# Patient Record
Sex: MALE | Race: BLACK OR AFRICAN AMERICAN
[De-identification: names, ages, dates, MRNs, and addresses within clinical notes are randomized per-mention and may not be internally consistent; named-entity substitution may affect disease eponyms.]

---

## 2017-07-08 ENCOUNTER — HOSPITAL ENCOUNTER (EMERGENCY)
Dept: HOSPITAL 62 - ER | Age: 27
Discharge: HOME | End: 2017-07-08
Payer: SELF-PAY

## 2017-07-08 VITALS — SYSTOLIC BLOOD PRESSURE: 138 MMHG | DIASTOLIC BLOOD PRESSURE: 79 MMHG

## 2017-07-08 DIAGNOSIS — F17.200: ICD-10-CM

## 2017-07-08 DIAGNOSIS — K29.00: Primary | ICD-10-CM

## 2017-07-08 DIAGNOSIS — R10.10: ICD-10-CM

## 2017-07-08 DIAGNOSIS — R11.2: ICD-10-CM

## 2017-07-08 LAB
ALBUMIN SERPL-MCNC: 4.5 G/DL (ref 3.5–5)
ALP SERPL-CCNC: 75 U/L (ref 38–126)
ALT SERPL-CCNC: 35 U/L (ref 21–72)
ANION GAP SERPL CALC-SCNC: 12 MMOL/L (ref 5–19)
AST SERPL-CCNC: 20 U/L (ref 17–59)
BASOPHILS # BLD AUTO: 0.1 10^3/UL (ref 0–0.2)
BASOPHILS NFR BLD AUTO: 1.1 % (ref 0–2)
BILIRUB DIRECT SERPL-MCNC: 0.2 MG/DL (ref 0–0.4)
BILIRUB SERPL-MCNC: 1.2 MG/DL (ref 0.2–1.3)
BUN SERPL-MCNC: 12 MG/DL (ref 7–20)
CALCIUM: 9.8 MG/DL (ref 8.4–10.2)
CHLORIDE SERPL-SCNC: 104 MMOL/L (ref 98–107)
CO2 SERPL-SCNC: 24 MMOL/L (ref 22–30)
CREAT SERPL-MCNC: 0.9 MG/DL (ref 0.52–1.25)
EOSINOPHIL # BLD AUTO: 0 10^3/UL (ref 0–0.6)
EOSINOPHIL NFR BLD AUTO: 0.9 % (ref 0–6)
ERYTHROCYTE [DISTWIDTH] IN BLOOD BY AUTOMATED COUNT: 13.4 % (ref 11.5–14)
GLUCOSE SERPL-MCNC: 107 MG/DL (ref 75–110)
HCT VFR BLD CALC: 44.2 % (ref 37.9–51)
HGB BLD-MCNC: 14.8 G/DL (ref 13.5–17)
HGB HCT DIFFERENCE: 0.2
LIPASE SERPL-CCNC: 40.9 U/L (ref 23–300)
LYMPHOCYTES # BLD AUTO: 1.7 10^3/UL (ref 0.5–4.7)
LYMPHOCYTES NFR BLD AUTO: 35 % (ref 13–45)
MCH RBC QN AUTO: 33.4 PG (ref 27–33.4)
MCHC RBC AUTO-ENTMCNC: 33.5 G/DL (ref 32–36)
MCV RBC AUTO: 100 FL (ref 80–97)
MONOCYTES # BLD AUTO: 0.6 10^3/UL (ref 0.1–1.4)
MONOCYTES NFR BLD AUTO: 12.6 % (ref 3–13)
NEUTROPHILS # BLD AUTO: 2.4 10^3/UL (ref 1.7–8.2)
NEUTS SEG NFR BLD AUTO: 50.4 % (ref 42–78)
POTASSIUM SERPL-SCNC: 3.7 MMOL/L (ref 3.6–5)
PROT SERPL-MCNC: 7.6 G/DL (ref 6.3–8.2)
RBC # BLD AUTO: 4.43 10^6/UL (ref 4.35–5.55)
SODIUM SERPL-SCNC: 139.5 MMOL/L (ref 137–145)
WBC # BLD AUTO: 4.8 10^3/UL (ref 4–10.5)

## 2017-07-08 PROCEDURE — 76705 ECHO EXAM OF ABDOMEN: CPT

## 2017-07-08 PROCEDURE — 96361 HYDRATE IV INFUSION ADD-ON: CPT

## 2017-07-08 PROCEDURE — 96374 THER/PROPH/DIAG INJ IV PUSH: CPT

## 2017-07-08 PROCEDURE — 80053 COMPREHEN METABOLIC PANEL: CPT

## 2017-07-08 PROCEDURE — 83690 ASSAY OF LIPASE: CPT

## 2017-07-08 PROCEDURE — 85025 COMPLETE CBC W/AUTO DIFF WBC: CPT

## 2017-07-08 PROCEDURE — 36415 COLL VENOUS BLD VENIPUNCTURE: CPT

## 2017-07-08 PROCEDURE — 99284 EMERGENCY DEPT VISIT MOD MDM: CPT

## 2017-07-08 NOTE — RADIOLOGY REPORT (SQ)
EXAM DESCRIPTION:  U/S ABDOMEN LIMITED W/O DOP



COMPLETED DATE/TIME:  7/8/2017 6:20 pm



REASON FOR STUDY:  upper abdominal pain



COMPARISON:  None.



TECHNIQUE:  Dynamic and static grayscale images acquired of the abdomen and recorded on PACS. Additio
nal selected color Doppler and spectral images recorded.



LIMITATIONS:  None.



FINDINGS:  PANCREAS: No masses.  Visualized pancreatic duct normal caliber.

LIVER: No masses. Echotexture normal.

LIVER VASCULATURE: Normal directional flow of the main portal vein and hepatic veins.

GALLBLADDER: No stones. Normal wall thickness. No pericholecystic fluid.

ULTRASOUND-DETECTED LACY'S SIGN: Negative.

INTRAHEPATIC DUCTS AND COMMON DUCT: CBD and intrahepatic ducts normal caliber. No filling defects.

INFERIOR VENA CAVA: Normal flow.

AORTA: No aneurysm.

RIGHT KIDNEY:  Normal size. Normal echogenicity. No solid or suspicious masses. No hydronephrosis. No
 calcifications.

PERITONEAL AND RIGHT PLEURAL SPACE: No ascites or effusions.

OTHER: No other significant findings.



IMPRESSION:  NORMAL RIGHT UPPER QUADRANT ULTRASOUND.



TECHNICAL DOCUMENTATION:  JOB ID:  [id number]

 2011 [employer]- All Rights Reserved

## 2017-07-08 NOTE — ER DOCUMENT REPORT
ED General





- General


Chief Complaint: Abdominal Pain


Stated Complaint: ABDOMINAL PAIN


Time Seen by Provider: 07/08/17 17:20


Mode of Arrival: Ambulatory


Notes: 


26-year-old male with a history of gastritis presents with 1 day of upper 

abdominal pain burning radiating to the throat "like something is trying to 

crawl up out of me."  Associated with nausea and vomiting.  Not a heavy drinker 

does not take NSAIDs.  Does not take acid suppression.  He states that he did 

take a Midol, but that did not help.  Shortness of breath or pleuritic component

, hematemesis.  No diarrhea.  No foreign travel.


TRAVEL OUTSIDE OF THE [place] IN LAST 30 DAYS: No





- Related Data


Allergies/Adverse Reactions: 


 





No Known Allergies Allergy (Verified 07/08/17 16:47)


 











Past Medical History





- General


Information source: Patient - Alcohol use.  Infrequent.





- Social History


Smoking Status: Current Every Day Smoker


Family History: None


Patient has suicidal ideation: No


Patient has homicidal ideation: No


Renal/ Medical History: Denies: Hx Peritoneal Dialysis





Review of Systems





- Review of Systems


Notes: 


REVIEW OF SYSTEMS





GEN: Denies fever, chills, weight loss


ENT: Denies sore throat, nasal discharge, ear pain


EYES: Denies blurry vision, eye pain, discharge


CV: Denies chest pain, palpitations, edema


RESP: Denies cough, shortness of breath, wheezing


GI: Abdominal pain and nausea


MSK: Denies joint pain/swelling, edema, 


SKIN: Denies rash, skin lesions


LYMPH: Denies swollen glands/lymph nodes


NEURO: Denies headache, focal weakness or numbness, dizziness


PSYCH: Denies depression, suicidal or homicidal ideation








PHYSICAL EXAMINATION





General: No acute distress, well-nourished


Head: Atraumatic, normocephalic


ENT: Mouth normal, oropharynx moist, no exudates or tonsillar enlargement


Eyes: Conjunctiva normal, pupils equal, lids normal


Neck: No JVD, supple, no guarding


CVS: Normal rate, regular rhythm, no murmurs


Resp: No resp distress, equal and normal breath sounds bilaterally


GI: Nondistended, soft, no tenderness to palpation, no rebound or guarding


Ext: No deformities, no edema, normal range of motion in upper and lower ext


Back: No CVA or midline TTP


Skin: No rash, warm


Lymphatic: No lymphadeopathy noted


Neuro: Awake, alert.  Face symmetric.  GCS 15.





Physical Exam





- Vital signs


Vitals: 


 











Temp Pulse Resp BP Pulse Ox


 


 98.3 F   76   14   146/81 H  96 


 


 07/08/17 16:47  07/08/17 16:47  07/08/17 16:47  07/08/17 16:47  07/08/17 16:47














Course





- Re-evaluation


Re-evalutation: 


07/08/17 18:39


Otherwise healthy 26-year-old male history of gastritis presents with upper 

abdominal pain signs and symptoms concerning for gastroesophageal reflux versus 

gastritis.  Low suspicion for hepatic or pancreatic disease.  Abdominal exam is 

benign.  Vital signs are normal.  He already received an ultrasound and labs 

were ordered before my evaluation.  Will rule out gallstones and treat for 

GERD.  Do not think this is any severe abdominal emergency requiring CT at this 

time given his lack of fever and lack of abdominal tenderness.





07/08/17 19:15


Improved, ultrasound and labs normal.  Discharge with proton pump inhibitor.


Insert my discharge I have discussed with the patient there likely diagnosis, 

aftercare plan, follow-up plans and my usual and customary return precautions.  

They verbalized understanding of this.





- Vital Signs


Vital signs: 


 











Temp Pulse Resp BP Pulse Ox


 


 98.3 F   76   14   146/81 H  96 


 


 07/08/17 16:47  07/08/17 16:47  07/08/17 18:40  07/08/17 16:47  07/08/17 16:47














- Laboratory


Result Diagrams: 


 07/08/17 18:40





 07/08/17 18:40


Laboratory results interpreted by me: 


 











  07/08/17





  18:40


 


MCV  100 H














- Diagnostic Test


Radiology reviewed: Image reviewed, Reports reviewed





Discharge





- Discharge


Clinical Impression: 


Gastritis


Qualifiers:


 Gastritis type: unspecified gastritis Chronicity: acute Gastritis bleeding: 

without bleeding Qualified Code(s): K29.00 - Acute gastritis without bleeding





Condition: Good


Disposition: HOME, SELF-CARE


Instructions:  Abdominal Pain ([facility])


Additional Instructions: 


Abdominal pain is likely caused by a highly acidic environment in your stomach, 

skull gastritis plus gastroesophageal reflux.  Please avoid fatty foods spicy 

food alcohol and anti-inflammatory drugs like ibuprofen Motrin naproxen and 

Aleve.  Please take medicine I am prescribing you in follow-up with your 

primary care doctor in 3 days.  Please return to the ER if you vomit blood have 

dark tarry stools or have bright red blood from her bottom.





Prescriptions: 


Pantoprazole Sodium [Protonix] 40 mg PO BID #60 tablet.

## 2017-07-08 NOTE — ER DOCUMENT REPORT
ED Medical Screen (RME)





- General


Chief Complaint: Abdominal Pain


Stated Complaint: ABDOMINAL PAIN


Time Seen by Provider: 07/08/17 17:20


Mode of Arrival: Ambulatory


Information source: Patient


Notes: 


26-year-old man who presents with nausea, vomiting and upper abdominal pain.  

Patient states that the symptoms started this morning.  He denies blood in the 

vomitus or black, tarry stools.








Past medical history: None


Allergies: None


Past surgical history: Back surgery 1 month ago


TRAVEL OUTSIDE OF THE [place] IN LAST 30 DAYS: No





- Related Data


Allergies/Adverse Reactions: 


 





No Known Allergies Allergy (Verified 07/08/17 16:47)


 











Past Medical History


Renal/ Medical History: Denies: Hx Peritoneal Dialysis





Physical Exam





- Vital signs


Vitals: 





 











Temp Pulse Resp BP Pulse Ox


 


 98.3 F   76   14   146/81 H  96 


 


 07/08/17 16:47  07/08/17 16:47  07/08/17 16:47  07/08/17 16:47  07/08/17 16:47














Course





- Vital Signs


Vital signs: 





 











Temp Pulse Resp BP Pulse Ox


 


 98.3 F   76   14   146/81 H  96 


 


 07/08/17 16:47  07/08/17 16:47  07/08/17 16:47  07/08/17 16:47  07/08/17 16:47

## 2017-10-16 ENCOUNTER — HOSPITAL ENCOUNTER (EMERGENCY)
Dept: HOSPITAL 62 - ER | Age: 27
Discharge: HOME | End: 2017-10-16
Payer: SELF-PAY

## 2017-10-16 VITALS — SYSTOLIC BLOOD PRESSURE: 139 MMHG | DIASTOLIC BLOOD PRESSURE: 79 MMHG

## 2017-10-16 DIAGNOSIS — L20.89: Primary | ICD-10-CM

## 2017-10-16 PROCEDURE — 99282 EMERGENCY DEPT VISIT SF MDM: CPT

## 2017-10-16 NOTE — ER DOCUMENT REPORT
ED Skin Rash/Insect Bite/Abscs





- General


Chief Complaint: Skin Problem


Stated Complaint: SKIN IRRITATION


Mode of Arrival: Ambulatory


Information source: Patient


TRAVEL OUTSIDE OF THE [place] IN LAST 30 DAYS: No





- HPI


Patient complains to provider of: Skin rash/lesion


Onset: Last week


Onset/Duration: Gradual


Quality of pain: Burning


Severity: Moderate


Skin Character: Rash


Quality of rash: Itchy, Burning


Identify cause: Yes


Similar symptoms previously: Yes


Notes: 





Patient arrives with complaints of rash.  Patient has a history of eczema.  He 

uses Eucerin and Aquaphor.  States that he uses and asked body wash.  States 

for the last week he has had worsening symptoms to the bilateral arms posterior 

legs and posterior neck.  He denies any fever.  He states that the rash is 

itchy.  He states that it burns when he scratches it open.  No drainage.  He 

denies any nausea, vomiting, diarrhea.  No difficulty breathing or swallowing.  

He is no history of new soaps detergents lotions or medications.  He has no 

other complaints at this time.





- Related Data


Allergies/Adverse Reactions: 


 





No Known Allergies Allergy (Verified 10/16/17 16:02)


 











Past Medical History





- Social History


Smoking Status: Unknown if Ever Smoked


Family History: None


Patient has suicidal ideation: No


Renal/ Medical History: Denies: Hx Peritoneal Dialysis





- Immunizations


Hx Diphtheria, Pertussis, Tetanus Vaccination: No





Review of Systems





- Review of Systems


-: Yes All other systems reviewed and negative





Physical Exam





- Vital signs


Vitals: 





 











Temp Pulse Resp BP Pulse Ox


 


 98.5 F   58 L  16   151/97 H  96 


 


 10/16/17 16:03  10/16/17 16:03  10/16/17 16:03  10/16/17 16:03  10/16/17 16:03














- Notes


Notes: 





GENERAL: alert, cooperative, nontoxic, no distress.


HEAD: normocephalic, atraumatic


EYES: conjunctiva pink without discharge, no external redness or swelling.


EARS: no external swelling, no external redness


NOSE: atraumatic, no external swelling


MOUTH/THROAT: mucous membranes moist and pink


NECK: soft, supple, full range of motion, no meningismus.


CHEST: no distress, lungs clear and equal throughout.  No wheezing, rales, 

rhonchi.


CARDIAC: regular rate and rhythm, no murmur, normal capillary refill, normal 

pulses.  


BACK: full range of motion, no CVA tenderness.


EXTREMITIES: full range of motion of all extremities.  No redness, no swelling.


NEURO: alert and oriented 3, no focal deficits, full range of motion of all 

extremities.


PYSCH: appropriate mood, affect.  Patient is cooperative.


SKIN: pink, warm, dry, thickened skin to the bilateral antecubital areas, 

bilateral popliteal areas, posterior neck, consistent with eczema.  No 

surrounding redness.  No drainage.  No abscess.  No petechiae or vesicles.  

Slight decreased pigmentation of these areas noted.








Course





- Re-evaluation


Re-evalutation: 





10/16/17 18:04


The patient is nontoxic appearing with stable vitals.  The patient has a 

history of eczema.  He has a rash consistent with an atopic dermatitis flare.  

I will discharge the patient home with a moderate potency steroid cream.  He 

was instructed to keep the skin moist with Eucerin and Aquaphor as he has been 

doing.  He was instructed to stop using the ask body wash and to use a 

sensitive skin soap such as Dove.  Instructed to follow-up if not better in 1 

week, sooner for increased pain, fever, redness, drainage, any further concerns.





The patient is noted to have elevated blood pressure during today's emergency 

department visit.  The patient was informed of this finding.  The patient was 

instructed that this may be related to pre-hypertension and requires further 

evaluation with a primary care provider.  The patient has no hypertensive 

symptoms at this time.





The patient's emergency department workup and current diagnosis were explained 

to the patient and or family.  Follow-up instructions were provided.  

Medications if prescribed were discussed. Instructions for when to return to 

the emergency department including specific  worrisome symptoms were discussed 

with the patient and/or family.





- Vital Signs


Vital signs: 





 











Temp Pulse Resp BP Pulse Ox


 


 98.5 F   58 L  16   151/97 H  96 


 


 10/16/17 16:03  10/16/17 16:03  10/16/17 16:03  10/16/17 16:03  10/16/17 16:03














Discharge





- Discharge


Clinical Impression: 


Atopic dermatitis


Qualifiers:


 Atopic dermatitis type: flexural Qualified Code(s): L20.89 - Other atopic 

dermatitis





Condition: Stable


Disposition: HOME, SELF-CARE


Instructions:  Atopic Dermatitis (Eczema) ([facility])


Additional Instructions: 


Take medications as prescribed.  Continue using the Aquaphor and Eucerin for 

moisture.  Stop using the Atarax body wash and use of sensitive skin soap such 

as Dove.  Follow-up if not better in 1 week, sooner for increased pain, fever, 

redness, drainage, any further concerns.





Your blood pressure was elevated during today's visit.  Have this rechecked 

with your doctor.


Prescriptions: 


Hydroxyzine HCl [Atarax 25 mg Tablet] 1 - 2 tab PO QID #25 tablet


Triamcinolone Acetonide 1 applic TP BID #90 g


Forms:  Elevated Blood Pressure


Referrals: 


[facility] [Provider Group] - Follow up as needed

## 2018-05-23 ENCOUNTER — HOSPITAL ENCOUNTER (EMERGENCY)
Dept: HOSPITAL 62 - ER | Age: 28
Discharge: HOME | End: 2018-05-23
Payer: SELF-PAY

## 2018-05-23 VITALS — SYSTOLIC BLOOD PRESSURE: 114 MMHG | DIASTOLIC BLOOD PRESSURE: 69 MMHG

## 2018-05-23 DIAGNOSIS — Z20.2: Primary | ICD-10-CM

## 2018-05-23 DIAGNOSIS — R36.9: ICD-10-CM

## 2018-05-23 DIAGNOSIS — R30.9: ICD-10-CM

## 2018-05-23 LAB
APPEARANCE UR: (no result)
APTT PPP: (no result) S
BILIRUB UR QL STRIP: NEGATIVE
CHLAM PCR: DETECTED
GLUCOSE UR STRIP-MCNC: NEGATIVE MG/DL
GON PCR: DETECTED
KETONES UR STRIP-MCNC: NEGATIVE MG/DL
NITRITE UR QL STRIP: NEGATIVE
PH UR STRIP: 6 [PH] (ref 5–9)
PROT UR STRIP-MCNC: 30 MG/DL
SP GR UR STRIP: 1.03
UROBILINOGEN UR-MCNC: 4 MG/DL (ref ?–2)

## 2018-05-23 PROCEDURE — 96372 THER/PROPH/DIAG INJ SC/IM: CPT

## 2018-05-23 PROCEDURE — 99283 EMERGENCY DEPT VISIT LOW MDM: CPT

## 2018-05-23 PROCEDURE — 87591 N.GONORRHOEAE DNA AMP PROB: CPT

## 2018-05-23 PROCEDURE — 81001 URINALYSIS AUTO W/SCOPE: CPT

## 2018-05-23 PROCEDURE — 87491 CHLMYD TRACH DNA AMP PROBE: CPT

## 2018-05-23 NOTE — ER DOCUMENT REPORT
ED General





- General


Chief Complaint: STD Exposure


Stated Complaint: STD CHECK


Time Seen by Provider: 05/23/18 13:18


Mode of Arrival: Ambulatory


Information source: Patient


TRAVEL OUTSIDE OF THE [place] IN LAST 30 DAYS: No





- HPI


Notes: 





27-year-old male presents to emergency room today for complaints of having pain 

with urination as well as penile drainage 1 day.  Reports he has recently had 

unprotected sexual intercourse with a female.  Denies any lesions on penis.  

Denies any fevers or chills.  Denies any testicular pain or rectal pain.  

Patient states he has been the same partner for a couple weeks.  Pain is here 

in 10, burning.  Worse with time, nothing makes better.  Has not tried over-the-

counter treatment for this.  Denies fevers, chills,  chest pain,palpitations,  

shortness of breath, dyspnea, nausea, vomiting, diarrhea, abdominal pain, 

hematuria,blurred vision, double vision, loss of vision, speech changes, LH, 

dizziness, syncope, headaches, wheezing, ST, URI, neck pain, weakness, bowel or 

bladder dysfunction, saddle anesthesia, numbness or tingling in bilateral upper 

or lower extremities equally, muscle paralysis, weakness in bilateral upper or 

lower extremities equally or rash. Denies IV drug use.   





- Related Data


Allergies/Adverse Reactions: 


 





No Known Allergies Allergy (Verified 05/23/18 12:10)


 











Past Medical History





- General


Information source: Patient





- Social History


Smoking Status: Unknown if Ever Smoked


Family History: None, Reviewed & Not Pertinent


Renal/ Medical History: Denies: Hx Peritoneal Dialysis





- Immunizations


Hx Diphtheria, Pertussis, Tetanus Vaccination: No





Review of Systems





- Review of Systems


Constitutional: No symptoms reported


EENT: No symptoms reported


Cardiovascular: No symptoms reported


Respiratory: No symptoms reported


Gastrointestinal: No symptoms reported


Genitourinary: No symptoms reported


Male Genitourinary: See HPI


Musculoskeletal: No symptoms reported


Skin: No symptoms reported


Hematologic/Lymphatic: No symptoms reported


Neurological/Psychological: No symptoms reported





Physical Exam





- Vital signs


Vitals: 


 











Pulse Resp BP Pulse Ox


 


 92   16   132/74 H  96 


 


 05/23/18 12:24  05/23/18 12:24  05/23/18 12:24  05/23/18 12:24














- Notes


Notes: 





PHYSICAL EXAMINATION:





GENERAL: Well-appearing, well-nourished and in no acute distress.





HEAD: Atraumatic, normocephalic.





EYES: Pupils equal round and reactive to light, extraocular movements intact, 

sclera anicteric, conjunctiva are normal.





ENT: Nares patent, oropharynx clear without exudates.  Moist mucous membranes.





NECK: Normal range of motion, supple without lymphadenopathy





LUNGS: Breath sounds clear to auscultation bilaterally and equal.  No wheezes 

rales or rhonchi.





HEART: Regular rate and rhythm without murmurs





ABDOMEN: Soft, nontender, nondistended abdomen.  No guarding, no rebound.  No 

masses appreciated.





gu: no lesion noted or drainage on inspection of penile shaft. no lesions 

noted. testicles descended. 





Musculoskeletal: Normal range of motion, no pitting or edema.  No cyanosis.





NEUROLOGICAL: Cranial nerves grossly intact.  Normal speech, normal gait.  

Normal sensory, motor exams 





PSYCH: Normal mood, normal affect.





SKIN: Warm, Dry, normal turgor, no rashes or lesions noted.





Course





- Re-evaluation


Re-evalutation: 





05/23/18 14:20


Patient is afebrile, vitals stable and in no distress.  Discussed with patient 

the importance of practicing safe sex by using barrier method such as condoms.  

Discussed we are not testing him for blood-borne pathogens such as HIV or 

hepatitis C. he will required when his primary care provider without department 

for further testing.  We are treating him for chlamydia and gonorrhea today 

with 1 g of azithromycin and 250 of Rocephin.  Discussed the patient to not 

engage in any sexual intercourse for at least 10 days and that his partner 

needs to get tested and treated she had sexual intercourse for 10 days as well.

  I have reevaluated this patient multiple times and no significant life 

threatening changes, no signs of toxicity, sepsis or peritonitis are noted. The 

patient  and I have discussed the diagnosis and risks, and we agree with 

discharging home and close follow-up. We also discussed returning to the 

Emergency Department immediately if new or worsening symptoms occur with the 

understanding that symptoms and presentations can change. At this time will 

discharge with return precautions and follow-up recommendations.  Verbal 

discharge instructions given a the bedside and opportunity for questions given. 

We have discussed the symptoms which are most concerning (e.g., saddle 

anesthesia, urinary or bowel incontinence or retention, changing or worsening 

pain) that necessitate immediate return. Medication warnings reviewed. Patient 

is in agreement with this plan and has verbalized understanding of return 

precautions and the need for primary care follow-up in the next 24-72 hours.  

Patient verbalized understanding of plan of care and agree with plan of care.

















- Vital Signs


Vital signs: 


 











Temp Pulse Resp BP Pulse Ox


 


 98.2 F   57 L  16   114/69   99 


 


 05/23/18 14:34  05/23/18 14:34  05/23/18 14:34  05/23/18 14:34  05/23/18 14:34














- Laboratory


Laboratory results interpreted by me: 


 











  05/23/18





  14:00


 


Urine Protein  30 H


 


Urine Urobilinogen  4.0 H


 


Ur Leukocyte Esterase  LARGE H














Discharge





- Discharge


Clinical Impression: 


 STD exposure, Drainage from penis, Concern about STD in male without diagnosis





Condition: Good


Disposition: HOME, SELF-CARE


Additional Instructions: 


Gonorrhea





     You have been diagnosed with gonorrhea.  In men, this germ infects the 

urethra (and sometimes the throat).  Men usually have drainage from the penis 

and pain with urination.  In women, the germ infects the vagina and fallopian 

tubes.  There may be discharge and pelvic pain. Some women have no symptoms at 

all.  The infection can do permanent damage to the tubes and ovaries.  It 

should be taken very seriously.


     Treatment is antibiotics.  It's important that you receive all recommended 

medication. Use condoms to prevent spread of the infection.


     Because this infection is spread sexually, your sexual partner must be 

checked before resuming sexual relations.  If a culture shows gonorrhea germs, 

it must be reported to the health department.


     Call the doctor or return at once if you develop increasing fever, rash, 

joint swelling, severe pelvic pain, vaginal bleeding (other than your period), 

or problems with your bladder or bowels.








Chlamydia





     You have a chlamydia infection.  Chlamydia is a germ that grows inside the 

cells of the mucous membranes.  It often infects the eyes, urethra, and 

fallopian tubes.  It can cause chronic pain and scar tissue if untreated.


     Antibiotics are used to treat chlamydia.  It's important to take all the 

medicine even if there are no symptoms. Use condoms to prevent spread of the 

infection.


     Because this infection can spread by sexual contact, it's important that 

your sexual partner be checked before resuming sexual relations.  A positive 

test for chlamydia has to be reported to the health department.


     Call the doctor or return at once if you develop increasing fever, rash, 

severe pelvic pain, vaginal bleeding (other than your period), or problems with 

your bladder or bowels.








you were treated here today for chlamydia and gonorrhea with 1 g of 

azithromycin and turned 50 mg of Rocephin IM. you need to use protection every 

time you have sex. Failure to do so can result in transmission of infections. 

You have been treated for an sexually transmitted infection (STI) today. All of 

your partners should be tested and treated as they are also likely to be 

infected.  Follow-up with your primary care provider at the health department 

for blood work to check for sexually transmitted blood-borne pathogens such as 

HIV, hepatitis C, etc.  Please return if you develop abdominal pain, fever, 

persistent vomiting, or any other symptoms that are concerning to you.





Return immediately for any new or worsening symptoms.





Follow up with primary care provider, call tomorrow to make followup 

appointment.


Referrals: 


BRINA BABCOCK MD [COMMUNITY BASED STAFF] - Follow up in 1 week


[facility] [NO LOCAL MD] - Follow up as needed

## 2019-06-14 ENCOUNTER — HOSPITAL ENCOUNTER (EMERGENCY)
Dept: HOSPITAL 62 - ER | Age: 29
LOS: 1 days | Discharge: HOME | End: 2019-06-15
Payer: SELF-PAY

## 2019-06-14 DIAGNOSIS — F12.10: ICD-10-CM

## 2019-06-14 DIAGNOSIS — F17.200: ICD-10-CM

## 2019-06-14 DIAGNOSIS — R10.13: Primary | ICD-10-CM

## 2019-06-14 DIAGNOSIS — R11.2: ICD-10-CM

## 2019-06-14 LAB
ADD MANUAL DIFF: NO
ALBUMIN SERPL-MCNC: 4.9 G/DL (ref 3.5–5)
ALP SERPL-CCNC: 78 U/L (ref 38–126)
ALT SERPL-CCNC: 30 U/L (ref 21–72)
ANION GAP SERPL CALC-SCNC: 12 MMOL/L (ref 5–19)
APPEARANCE UR: (no result)
APTT PPP: (no result) S
AST SERPL-CCNC: 33 U/L (ref 17–59)
BARBITURATES UR QL SCN: NEGATIVE
BASOPHILS # BLD AUTO: 0 10^3/UL (ref 0–0.2)
BASOPHILS NFR BLD AUTO: 0.7 % (ref 0–2)
BILIRUB DIRECT SERPL-MCNC: 0.3 MG/DL (ref 0–0.4)
BILIRUB SERPL-MCNC: 1 MG/DL (ref 0.2–1.3)
BILIRUB UR QL STRIP: (no result)
BUN SERPL-MCNC: 11 MG/DL (ref 7–20)
CALCIUM: 11.1 MG/DL (ref 8.4–10.2)
CHLORIDE SERPL-SCNC: 102 MMOL/L (ref 98–107)
CO2 SERPL-SCNC: 26 MMOL/L (ref 22–30)
EOSINOPHIL # BLD AUTO: 0.1 10^3/UL (ref 0–0.6)
EOSINOPHIL NFR BLD AUTO: 2.4 % (ref 0–6)
ERYTHROCYTE [DISTWIDTH] IN BLOOD BY AUTOMATED COUNT: 14 % (ref 11.5–14)
GLUCOSE SERPL-MCNC: 88 MG/DL (ref 75–110)
GLUCOSE UR STRIP-MCNC: NEGATIVE MG/DL
HCT VFR BLD CALC: 47.3 % (ref 37.9–51)
HGB BLD-MCNC: 16.2 G/DL (ref 13.5–17)
KETONES UR STRIP-MCNC: (no result) MG/DL
LIPASE SERPL-CCNC: 41.5 U/L (ref 23–300)
LYMPHOCYTES # BLD AUTO: 1.6 10^3/UL (ref 0.5–4.7)
LYMPHOCYTES NFR BLD AUTO: 33 % (ref 13–45)
MCH RBC QN AUTO: 34.2 PG (ref 27–33.4)
MCHC RBC AUTO-ENTMCNC: 34.3 G/DL (ref 32–36)
MCV RBC AUTO: 100 FL (ref 80–97)
METHADONE UR QL SCN: NEGATIVE
MONOCYTES # BLD AUTO: 0.4 10^3/UL (ref 0.1–1.4)
MONOCYTES NFR BLD AUTO: 8.8 % (ref 3–13)
NEUTROPHILS # BLD AUTO: 2.7 10^3/UL (ref 1.7–8.2)
NEUTS SEG NFR BLD AUTO: 55.1 % (ref 42–78)
NITRITE UR QL STRIP: NEGATIVE
PCP UR QL SCN: NEGATIVE
PH UR STRIP: 5 [PH] (ref 5–9)
PLATELET # BLD: 305 10^3/UL (ref 150–450)
POTASSIUM SERPL-SCNC: 4.4 MMOL/L (ref 3.6–5)
PROT SERPL-MCNC: 8.3 G/DL (ref 6.3–8.2)
PROT UR STRIP-MCNC: 30 MG/DL
RBC # BLD AUTO: 4.74 10^6/UL (ref 4.35–5.55)
SODIUM SERPL-SCNC: 140.4 MMOL/L (ref 137–145)
SP GR UR STRIP: 1.03
TOTAL CELLS COUNTED % (AUTO): 100 %
URINE AMPHETAMINES SCREEN: NEGATIVE
URINE BENZODIAZEPINES SCREEN: NEGATIVE
URINE COCAINE SCREEN: NEGATIVE
URINE MARIJUANA (THC) SCREEN: (no result)
UROBILINOGEN UR-MCNC: 4 MG/DL (ref ?–2)
WBC # BLD AUTO: 4.9 10^3/UL (ref 4–10.5)

## 2019-06-14 PROCEDURE — 83690 ASSAY OF LIPASE: CPT

## 2019-06-14 PROCEDURE — 99284 EMERGENCY DEPT VISIT MOD MDM: CPT

## 2019-06-14 PROCEDURE — 80307 DRUG TEST PRSMV CHEM ANLYZR: CPT

## 2019-06-14 PROCEDURE — 81001 URINALYSIS AUTO W/SCOPE: CPT

## 2019-06-14 PROCEDURE — 96361 HYDRATE IV INFUSION ADD-ON: CPT

## 2019-06-14 PROCEDURE — 96374 THER/PROPH/DIAG INJ IV PUSH: CPT

## 2019-06-14 PROCEDURE — 85025 COMPLETE CBC W/AUTO DIFF WBC: CPT

## 2019-06-14 PROCEDURE — 36415 COLL VENOUS BLD VENIPUNCTURE: CPT

## 2019-06-14 PROCEDURE — 76700 US EXAM ABDOM COMPLETE: CPT

## 2019-06-14 PROCEDURE — 80053 COMPREHEN METABOLIC PANEL: CPT

## 2019-06-14 NOTE — ER DOCUMENT REPORT
ED General





- General


Chief Complaint: Abdominal Pain


Stated Complaint: STOMACH PAINS


Time Seen by Provider: 06/14/19 19:38


Mode of Arrival: Ambulatory


Notes: 





Patient is a 28-year-old male without chronic medical problems, no prior 

abdominal surgical history, presents with complaints of diffuse abdominal pain 

with associated nausea and vomiting.  States that his symptoms started earlier 

today relatively abruptly and have been ongoing since that time.  Describes the 

pain is up to him abdomen is being an aching, throbbing, constant pain mostly 

localized to the epigastrium.  States that he has been having intractable 

vomiting in association with the pain but the vomiting did start first.  

Symptoms are regarded as severe, constant, no obvious exacerbating factor.  

States he did take a hot shower earlier and it did seem to relieve his symptoms.

 Denies ever having similar symptoms in the past.  Has not seen his primary 

physician regarding today's concerns.  Denies fever or constitutional symptoms.


TRAVEL OUTSIDE OF THE [place] IN LAST 30 DAYS: No





- Related Data


Allergies/Adverse Reactions: 


                                        





No Known Allergies Allergy (Verified 06/14/19 19:43)


   











Past Medical History





- General


Information source: Patient





- Social History


Smoking Status: Current Every Day Smoker


Frequency of alcohol use: Occasional


Drug Abuse: Marijuana


Lives with: Spouse/Significant other


Family History: Reviewed & Not Pertinent


Patient has suicidal ideation: No


Patient has homicidal ideation: No


Renal/ Medical History: Denies: Hx Peritoneal Dialysis





- Immunizations


Hx Diphtheria, Pertussis, Tetanus Vaccination: No





Review of Systems





- Review of Systems


Notes: 





Constitutional: Negative for fever.


HENT: Negative for sore throat.


Eyes: Negative for visual changes.


Cardiovascular: Negative for chest pain.


Respiratory: Negative for shortness of breath.


Gastrointestinal: Positive for abdominal pain and vomiting


Genitourinary: Negative for dysuria.


Musculoskeletal: Negative for back pain.


Skin: Negative for rash.


Neurological: Negative for headaches, weakness or numbness.





10 point ROS negative except as marked above and in HPI.





Physical Exam





- Vital signs


Vitals: 


                                        











Temp Pulse Resp BP Pulse Ox


 


 98.6 F   107 H  16   147/88 H  97 


 


 06/14/19 19:20  06/14/19 19:20  06/14/19 19:20  06/14/19 19:20  06/14/19 19:20











Interpretation: Tachycardic


Notes: 





PHYSICAL EXAMINATION:





GENERAL: In around the room, in no obvious distress





HEAD: Atraumatic, normocephalic.





EYES: Pupils equal round and reactive to light, extraocular movements intact, 

sclera anicteric, conjunctiva are normal.





ENT: nares patent, oropharynx clear without exudates.  Moderately dry mucous 

membranes.





NECK: Normal range of motion, supple without lymphadenopathy





LUNGS: Breath sounds clear to auscultation bilaterally and equal.  No wheezes 

rales or rhonchi.





HEART: Regular rate and rhythm without murmurs





ABDOMEN: Soft, nontender, normoactive bowel sounds.  No guarding, no rebound.  

No masses appreciated.





EXTREMITIES: Normal range of motion, no pitting or edema.  No cyanosis.





NEUROLOGICAL: No focal neurological deficits. Moves all extremities 

spontaneously and on command.





PSYCH: High energy, unusual activity pattern for location





SKIN: Warm, Dry, normal turgor, no rashes or lesions noted.





Course





- Re-evaluation


Re-evalutation: 





06/14/19 23:04


Patient presents jumping up and down, almost jogging in place when entering to 

the room complaining of upper abdominal pain with associated nausea and vomitin

g.  The patient is bouncing vigorously about the room like take my history 

stating that this helps his discomfort.  The patient is able to lay down for an 

abdominal exam eventually and has no focal abdominal tenderness, rebound or 

guarding.  Has had associated nausea and vomiting.  Labs and right upper 

quadrant ultrasound are unremarkable.  Clinical history not consistent with 

biliary pathology, pancreatitis, bowel obstruction or perforation.  Abdominal 

exam, vitals and history are otherwise very reassuring.  Patient's rapid 

movements about the room are not consistent with renal colic.  Patient reports 

that his symptoms are much relieved by hot showers, admits to heavy daily 

marijuana use and I question whether or not this could be a presentation of 

cannabis induced cyclical abdominal pain and cyclical vomiting.  Has been 

treated with IV haloperidol and IV fluids with improvement of symptoms.  Do not 

feel CT imaging the abdomen and pelvis is indicated at this time based on 

reassuring exam and history.  Have advised cessation of marijuana use.  At this 

time will discharge with return precautions and follow-up recommendations.  

Verbal discharge instructions given a the bedside and opportunity for questions 

given. Medication warnings reviewed. Patient is in agreement with this plan and 

has verbalized understanding of return precautions and the need for primary care

follow-up in the next 24-72 hours.





- Vital Signs


Vital signs: 


                                        











Temp Pulse Resp BP Pulse Ox


 


 98.2 F   88   18   130/78 H  98 


 


 06/15/19 00:38  06/15/19 00:38  06/15/19 00:38  06/15/19 00:38  06/15/19 00:38














- Laboratory


Result Diagrams: 


                                 06/14/19 20:40





                                 06/14/19 20:40


Laboratory results interpreted by me: 


                                        











  06/14/19 06/14/19 06/14/19





  20:40 20:40 22:13


 


MCV  100 H  


 


MCH  34.2 H  


 


Calcium   11.1 H 


 


Total Protein   8.3 H 


 


Urine Protein    30 H


 


Urine Ketones    TRACE H


 


Urine Bilirubin    SMALL H


 


Urine Urobilinogen    4.0 H


 


Ur Leukocyte Esterase    SMALL H














Discharge





- Discharge


Clinical Impression: 


 Upper abdominal pain, Marijuana use





Nausea and vomiting


Qualifiers:


 Vomiting type: unspecified Vomiting Intractability: non-intractable Qualified 

Code(s): R11.2 - Nausea with vomiting, unspecified





Condition: Good


Disposition: HOME, SELF-CARE


Additional Instructions: 


Your signs and symptoms today are most consistent with marijuana induced 

hyperemesis syndrome also known as cannabinoid hyperemesis syndrome.  This 

syndrome typically occurs in people who have smoked marijuana for many years 

without any symptoms of any kind and can start abruptly.  Your symptoms will in 

general improve if you take a hot shower but ultimately the only cure to this 

illness is to discontinue the use of marijuana.  While marijuana in of itself is

not a dangerous drug, once you have developed this syndrome you typically will 

have recurrence of your symptoms anytime you smoke.  If you do have recurrence 

of these symptoms, you may apply topical capsaicin cream to your abdomen which 

can be purchased over-the-counter.  Return to the emergency department if you 

have vomiting that you cannot stop, you pass out, or you have any other symptoms

that are worrisome to you.


Forms:  Return to Work

## 2019-06-14 NOTE — ER DOCUMENT REPORT
ED Medical Screen (RME)





- General


Chief Complaint: Abdominal Pain


Stated Complaint: STOMACH PAINS


Time Seen by Provider: 06/14/19 19:38


Mode of Arrival: Ambulatory


Information source: Patient


Notes: 





28-year-old male presented to ED for complaint of upper abdominal pain across 

left and right side.  States it started yesterday.  He states he has had nausea 

and vomiting and has vomited 5 times today.  Patient states he feels very 

dehydrated.  Patient is alert oriented respirations regular and unlabored 

speaking in full sentences walks with even steady gait.  Patient does have 

active bowel sounds and does have tenderness to the upper abdomen.











I have greeted and performed a rapid initial assessment of this patient.  A 

comprehensive ED assessment and evaluation of the patient, analysis of test 

results and completion of medical decision making process will be conducted by 

an additional ED providers.





Dictation of this chart was performed using voice recognition software; 

therefore, there may be some unintended grammatical errors.


TRAVEL OUTSIDE OF THE [place] IN LAST 30 DAYS: No





- Related Data


Allergies/Adverse Reactions: 


                                        





No Known Allergies Allergy (Verified 06/14/19 19:17)


   











Past Medical History


Renal/ Medical History: Denies: Hx Peritoneal Dialysis





- Immunizations


Hx Diphtheria, Pertussis, Tetanus Vaccination: No


History of Influenza Vaccine for 10/2017 - 3/2018 Season: No





Physical Exam





- Vital signs


Vitals: 





                                        











Temp Pulse Resp BP Pulse Ox


 


 98.6 F   107 H  16   147/88 H  97 


 


 06/14/19 19:20  06/14/19 19:20  06/14/19 19:20  06/14/19 19:20  06/14/19 19:20














Course





- Vital Signs


Vital signs: 





                                        











Temp Pulse Resp BP Pulse Ox


 


 98.6 F   107 H  16   147/88 H  97 


 


 06/14/19 19:20  06/14/19 19:20  06/14/19 19:20  06/14/19 19:20  06/14/19 19:20

## 2019-06-14 NOTE — RADIOLOGY REPORT (SQ)
EXAM DESCRIPTION: 



US ABDOMEN



COMPLETED DATE/TME:  06/14/2019 19:41



CLINICAL HISTORY: 



upper abdominal pain 



COMPARISON: 



None.

 

FINDINGS: 



Visualized portion of the pancreas and hepatic parenchyma are

within normal limits. Aorta is of normal caliber and tapering.

The right kidney measured 10.3 x 4.1 x 4.6 cm. Echotexture of the

right kidney is within normal limits. There are no gallstones,

gallbladder wall thickening or pericholecystic fluid collection.

The common bile duct measured 2.6 mm which is within normal

limits. Spleen measured 9.5 cm in length and is of homogeneous

echotexture. Left kidney measured 9.2 x 5.9 x 4.4 cm. The

echotexture of both kidneys within normal limits. There is no

hydronephrosis. There is no free fluid in the abdomen.



IMPRESSION: 



Normal exam.

## 2019-06-15 VITALS — DIASTOLIC BLOOD PRESSURE: 78 MMHG | SYSTOLIC BLOOD PRESSURE: 130 MMHG

## 2019-06-21 ENCOUNTER — HOSPITAL ENCOUNTER (EMERGENCY)
Dept: HOSPITAL 62 - ER | Age: 29
Discharge: HOME | End: 2019-06-21
Payer: SELF-PAY

## 2019-06-21 VITALS — DIASTOLIC BLOOD PRESSURE: 95 MMHG | SYSTOLIC BLOOD PRESSURE: 148 MMHG

## 2019-06-21 DIAGNOSIS — F12.10: ICD-10-CM

## 2019-06-21 DIAGNOSIS — R11.2: ICD-10-CM

## 2019-06-21 DIAGNOSIS — R10.10: ICD-10-CM

## 2019-06-21 DIAGNOSIS — K29.00: Primary | ICD-10-CM

## 2019-06-21 LAB
ADD MANUAL DIFF: NO
BASOPHILS # BLD AUTO: 0 10^3/UL (ref 0–0.2)
BASOPHILS NFR BLD AUTO: 0.2 % (ref 0–2)
EOSINOPHIL # BLD AUTO: 0 10^3/UL (ref 0–0.6)
EOSINOPHIL NFR BLD AUTO: 0 % (ref 0–6)
ERYTHROCYTE [DISTWIDTH] IN BLOOD BY AUTOMATED COUNT: 13.9 % (ref 11.5–14)
HCT VFR BLD CALC: 43.6 % (ref 37.9–51)
HGB BLD-MCNC: 14.9 G/DL (ref 13.5–17)
LYMPHOCYTES # BLD AUTO: 0.8 10^3/UL (ref 0.5–4.7)
LYMPHOCYTES NFR BLD AUTO: 7.7 % (ref 13–45)
MCH RBC QN AUTO: 34.3 PG (ref 27–33.4)
MCHC RBC AUTO-ENTMCNC: 34.3 G/DL (ref 32–36)
MCV RBC AUTO: 100 FL (ref 80–97)
MONOCYTES # BLD AUTO: 0.7 10^3/UL (ref 0.1–1.4)
MONOCYTES NFR BLD AUTO: 6.6 % (ref 3–13)
NEUTROPHILS # BLD AUTO: 8.9 10^3/UL (ref 1.7–8.2)
NEUTS SEG NFR BLD AUTO: 85.5 % (ref 42–78)
PLATELET # BLD: 301 10^3/UL (ref 150–450)
RBC # BLD AUTO: 4.35 10^6/UL (ref 4.35–5.55)
TOTAL CELLS COUNTED % (AUTO): 100 %
WBC # BLD AUTO: 10.4 10^3/UL (ref 4–10.5)

## 2019-06-21 PROCEDURE — 85025 COMPLETE CBC W/AUTO DIFF WBC: CPT

## 2019-06-21 PROCEDURE — 99284 EMERGENCY DEPT VISIT MOD MDM: CPT

## 2019-06-21 PROCEDURE — 36415 COLL VENOUS BLD VENIPUNCTURE: CPT

## 2019-06-21 NOTE — ER DOCUMENT REPORT
ED Medical Screen (RME)





- General


Chief Complaint: Abdominal Pain


Stated Complaint: ABDOMINAL PAIN


Time Seen by Provider: 06/21/19 03:56


Notes: 





Patient is a 28-year-old male without chronic medical problems, no prior 

abdominal surgical history, presents with complaints of diffuse abdominal pain 

with associated nausea and vomiting.  States that his symptoms started 2 days 

ago.  Describes the pain as being an aching, throbbing, constant pain mostly 

localized to the epigastrium.  States that he has been having intractable 

vomiting approximately 20 times in association with the pain but the vomiting 

did start first.  Patient has had diarrhea x3.  Patient presented is tachycardic

to 144 but patient has been constantly jogging around the ER, running in place 

and has not sat down while waiting.





I have greeted and performed a rapid initial assessment of this patient.  A 

comprehensive ED assessment and evaluation of the patient, analysis of test 

results and completion of medical decision making process will be conducted by 

an additional ED providers.


TRAVEL OUTSIDE OF THE [place] IN LAST 30 DAYS: No





- Related Data


Allergies/Adverse Reactions: 


                                        





No Known Allergies Allergy (Verified 06/14/19 19:43)


   











Past Medical History


Renal/ Medical History: Denies: Hx Peritoneal Dialysis





- Immunizations


Hx Diphtheria, Pertussis, Tetanus Vaccination: No


History of Influenza Vaccine for 10/2017 - 3/2018 Season: No





Physical Exam





- Vital signs


Vitals: 





                                        











Temp Pulse Resp BP Pulse Ox


 


 98.9 F   144 H  24 H  148/95 H  96 


 


 06/21/19 00:44  06/21/19 00:44  06/21/19 00:44  06/21/19 00:44  06/21/19 00:44














Course





- Vital Signs


Vital signs: 





                                        











Temp Pulse Resp BP Pulse Ox


 


 98.9 F   144 H  24 H  148/95 H  96 


 


 06/21/19 00:44  06/21/19 00:44  06/21/19 00:44  06/21/19 00:44  06/21/19 00:44














- Laboratory


Result Diagrams: 


                                 06/21/19 03:13





                                 06/21/19 03:13


Laboratory results interpreted by me: 





                                        











  06/21/19





  03:13


 


MCV  100 H


 


MCH  34.3 H


 


Seg Neutrophils %  85.5 H


 


Lymphocytes %  7.7 L


 


Absolute Neutrophils  8.9 H

## 2019-06-21 NOTE — ER DOCUMENT REPORT
ED General





- General


Chief Complaint: Abdominal Pain


Stated Complaint: ABDOMINAL PAIN


Time Seen by Provider: 06/21/19 03:56


Notes: 





Patient presents with 2 days upper abdominal pain vomiting diarrhea.  Decreased 

oral intake.  Moderate.  Minimal pain now.  I received fluids and medications at

triage.  Smokes marijuana daily.  No fevers.  No urinary symptoms.


TRAVEL OUTSIDE OF THE [place] IN LAST 30 DAYS: No





- Related Data


Allergies/Adverse Reactions: 


                                        





No Known Allergies Allergy (Verified 06/14/19 19:43)


   











Past Medical History





- Social History


Smoking Status: Never Smoker


Drug Abuse: Marijuana


Family History: Reviewed & Not Pertinent


Renal/ Medical History: Denies: Hx Peritoneal Dialysis





- Immunizations


Hx Diphtheria, Pertussis, Tetanus Vaccination: No





Review of Systems





- Review of Systems


Notes: 





REVIEW OF SYSTEMS





GEN: Denies fever, chills, weight loss


ENT: Denies sore throat, nasal discharge, ear pain


EYES: Denies blurry vision, eye pain, discharge


CV: Denies chest pain, palpitations, edema


RESP: Denies cough, shortness of breath, wheezing


GI: HPI


MSK: Denies joint pain/swelling, edema, 


SKIN: Denies rash, skin lesions


LYMPH: Denies swollen glands/lymph nodes


NEURO: Denies headache, focal weakness or numbness, dizziness


PSYCH: Denies depression, suicidal or homicidal ideation








PHYSICAL EXAMINATION





General: No acute distress, well-nourished


Head: Atraumatic, normocephalic


ENT: Mouth normal, oropharynx moist, no exudates or tonsillar enlargement


Eyes: Conjunctiva normal, pupils equal, lids normal


Neck: No JVD, supple, no guarding


CVS: Normal rate, regular rhythm, no murmurs


Resp: No resp distress, equal and normal breath sounds bilaterally


GI: Nondistended, soft, no tenderness to palpation, no rebound or guarding


Ext: No deformities, no edema, normal range of motion in upper and lower ext


Back: No CVA or midline TTP


Skin: No rash, warm


Lymphatic: No lymphadeopathy noted


Neuro: Awake, alert.  Face symmetric.  GCS 15.





Physical Exam





- Vital signs


Vitals: 


                                        











Temp Pulse Resp BP Pulse Ox


 


 98.9 F   144 H  24 H  148/95 H  96 


 


 06/21/19 00:44  06/21/19 00:44  06/21/19 00:44  06/21/19 00:44  06/21/19 00:44














Course





- Re-evaluation


Re-evalutation: 





06/21/19 13:21


Recurrent upper abdominal pain vomiting in setting of a marijuana use likely 

gastritis or cannabinoid hyperemesis.  Labs normal no tenderness.  Symptoms 

resolved after medications and fluids.  Repeat exam nontender.  Tolerated p.o.  

No indication for imaging.  Discharged in stable condition.  Recommended 

marijuana cessation.  I have discussed with the patient there likely diagnosis, 

aftercare plan, follow-up plans and my usual and customary return precautions.  

They verbalized understanding of this.





- Vital Signs


Vital signs: 


                                        











Temp Pulse Resp BP Pulse Ox


 


 98.9 F   144 H  24 H  148/95 H  96 


 


 06/21/19 00:44  06/21/19 00:44  06/21/19 00:44  06/21/19 00:44  06/21/19 00:44














- Laboratory


Result Diagrams: 


                                 06/21/19 03:13





                                 06/21/19 03:13


Laboratory results interpreted by me: 


                                        











  06/21/19





  03:13


 


MCV  100 H


 


MCH  34.3 H


 


Seg Neutrophils %  85.5 H


 


Lymphocytes %  7.7 L


 


Absolute Neutrophils  8.9 H














Discharge





- Discharge


Clinical Impression: 


Gastritis


Qualifiers:


 Gastritis type: unspecified gastritis Chronicity: acute Gastritis bleeding: 

without bleeding Qualified Code(s): K29.00 - Acute gastritis without bleeding





Condition: Good


Disposition: HOME, SELF-CARE


Instructions:  Abdominal Pain ([facility]), Gastritis ([facility])


Prescriptions: 


RX: Lansoprazole [Prevacid 24Hr] 15 mg PO DAILY 30 Days  capsule.


Ondansetron [Zofran Odt 4 mg Tablet] 1 - 2 tab PO Q4H PRN #15 tab.rapdis


 PRN Reason: For Nausea/Vomiting


Forms:  Return to Work

## 2019-06-22 ENCOUNTER — HOSPITAL ENCOUNTER (INPATIENT)
Dept: HOSPITAL 62 - ER | Age: 29
LOS: 3 days | Discharge: HOME | DRG: 683 | End: 2019-06-25
Attending: INTERNAL MEDICINE | Admitting: INTERNAL MEDICINE
Payer: SELF-PAY

## 2019-06-22 DIAGNOSIS — N17.9: Primary | ICD-10-CM

## 2019-06-22 DIAGNOSIS — K29.60: ICD-10-CM

## 2019-06-22 DIAGNOSIS — F17.210: ICD-10-CM

## 2019-06-22 DIAGNOSIS — M62.82: ICD-10-CM

## 2019-06-22 DIAGNOSIS — F12.90: ICD-10-CM

## 2019-06-22 LAB
ADD MANUAL DIFF: NO
ALBUMIN SERPL-MCNC: 5.1 G/DL (ref 3.5–5)
ALP SERPL-CCNC: 66 U/L (ref 38–126)
ALT SERPL-CCNC: 97 U/L (ref 21–72)
ANION GAP SERPL CALC-SCNC: 13 MMOL/L (ref 5–19)
APPEARANCE UR: (no result)
APTT PPP: YELLOW S
AST SERPL-CCNC: 366 U/L (ref 17–59)
BARBITURATES UR QL SCN: NEGATIVE
BASOPHILS # BLD AUTO: 0 10^3/UL (ref 0–0.2)
BASOPHILS NFR BLD AUTO: 0.6 % (ref 0–2)
BILIRUB DIRECT SERPL-MCNC: 0.3 MG/DL (ref 0–0.4)
BILIRUB SERPL-MCNC: 1.3 MG/DL (ref 0.2–1.3)
BILIRUB UR QL STRIP: NEGATIVE
BUN SERPL-MCNC: 21 MG/DL (ref 7–20)
CALCIUM: 10.5 MG/DL (ref 8.4–10.2)
CHLORIDE SERPL-SCNC: 98 MMOL/L (ref 98–107)
CK MB SERPL-MCNC: 28 NG/ML (ref ?–4.55)
CO2 SERPL-SCNC: 26 MMOL/L (ref 22–30)
EOSINOPHIL # BLD AUTO: 0 10^3/UL (ref 0–0.6)
EOSINOPHIL NFR BLD AUTO: 0 % (ref 0–6)
ERYTHROCYTE [DISTWIDTH] IN BLOOD BY AUTOMATED COUNT: 13.5 % (ref 11.5–14)
GLUCOSE SERPL-MCNC: 104 MG/DL (ref 75–110)
GLUCOSE UR STRIP-MCNC: NEGATIVE MG/DL
HCT VFR BLD CALC: 45 % (ref 37.9–51)
HGB BLD-MCNC: 15.6 G/DL (ref 13.5–17)
KETONES UR STRIP-MCNC: (no result) MG/DL
LIPASE SERPL-CCNC: 58.8 U/L (ref 23–300)
LYMPHOCYTES # BLD AUTO: 1.4 10^3/UL (ref 0.5–4.7)
LYMPHOCYTES NFR BLD AUTO: 18.5 % (ref 13–45)
MCH RBC QN AUTO: 34.2 PG (ref 27–33.4)
MCHC RBC AUTO-ENTMCNC: 34.7 G/DL (ref 32–36)
MCV RBC AUTO: 98 FL (ref 80–97)
METHADONE UR QL SCN: NEGATIVE
MONOCYTES # BLD AUTO: 0.9 10^3/UL (ref 0.1–1.4)
MONOCYTES NFR BLD AUTO: 11.3 % (ref 3–13)
NEUTROPHILS # BLD AUTO: 5.3 10^3/UL (ref 1.7–8.2)
NEUTS SEG NFR BLD AUTO: 69.6 % (ref 42–78)
NITRITE UR QL STRIP: NEGATIVE
PCP UR QL SCN: NEGATIVE
PH UR STRIP: 6 [PH] (ref 5–9)
PLATELET # BLD: 239 10^3/UL (ref 150–450)
POTASSIUM SERPL-SCNC: 4.3 MMOL/L (ref 3.6–5)
PROT SERPL-MCNC: 8.2 G/DL (ref 6.3–8.2)
PROT UR STRIP-MCNC: 30 MG/DL
RBC # BLD AUTO: 4.57 10^6/UL (ref 4.35–5.55)
SODIUM SERPL-SCNC: 137.4 MMOL/L (ref 137–145)
SP GR UR STRIP: 1.02
TOTAL CELLS COUNTED % (AUTO): 100 %
TROPONIN I SERPL-MCNC: < 0.012 NG/ML
URINE AMPHETAMINES SCREEN: NEGATIVE
URINE BENZODIAZEPINES SCREEN: NEGATIVE
URINE COCAINE SCREEN: NEGATIVE
URINE MARIJUANA (THC) SCREEN: (no result)
UROBILINOGEN UR-MCNC: NEGATIVE MG/DL (ref ?–2)
WBC # BLD AUTO: 7.6 10^3/UL (ref 4–10.5)

## 2019-06-22 PROCEDURE — 85025 COMPLETE CBC W/AUTO DIFF WBC: CPT

## 2019-06-22 PROCEDURE — 81001 URINALYSIS AUTO W/SCOPE: CPT

## 2019-06-22 PROCEDURE — 99285 EMERGENCY DEPT VISIT HI MDM: CPT

## 2019-06-22 PROCEDURE — 82550 ASSAY OF CK (CPK): CPT

## 2019-06-22 PROCEDURE — 80053 COMPREHEN METABOLIC PANEL: CPT

## 2019-06-22 PROCEDURE — S0164 INJECTION PANTROPRAZOLE: HCPCS

## 2019-06-22 PROCEDURE — 80048 BASIC METABOLIC PNL TOTAL CA: CPT

## 2019-06-22 PROCEDURE — 93976 VASCULAR STUDY: CPT

## 2019-06-22 PROCEDURE — 84484 ASSAY OF TROPONIN QUANT: CPT

## 2019-06-22 PROCEDURE — 85027 COMPLETE CBC AUTOMATED: CPT

## 2019-06-22 PROCEDURE — S0183 PROCHLORPERAZINE 5 MG: HCPCS

## 2019-06-22 PROCEDURE — 83690 ASSAY OF LIPASE: CPT

## 2019-06-22 PROCEDURE — 96372 THER/PROPH/DIAG INJ SC/IM: CPT

## 2019-06-22 PROCEDURE — 36415 COLL VENOUS BLD VENIPUNCTURE: CPT

## 2019-06-22 PROCEDURE — 82553 CREATINE MB FRACTION: CPT

## 2019-06-22 PROCEDURE — 80307 DRUG TEST PRSMV CHEM ANLYZR: CPT

## 2019-06-22 PROCEDURE — 76705 ECHO EXAM OF ABDOMEN: CPT

## 2019-06-22 RX ADMIN — TRAMADOL HYDROCHLORIDE PRN MG: 50 TABLET, FILM COATED ORAL at 21:36

## 2019-06-22 RX ADMIN — HEPARIN SODIUM SCH: 5000 INJECTION, SOLUTION INTRAVENOUS; SUBCUTANEOUS at 21:37

## 2019-06-22 RX ADMIN — SODIUM CHLORIDE PRN MLS/HR: 9 INJECTION, SOLUTION INTRAVENOUS at 09:16

## 2019-06-22 RX ADMIN — HALOPERIDOL LACTATE PRN MG: 5 INJECTION, SOLUTION INTRAMUSCULAR at 14:52

## 2019-06-22 RX ADMIN — Medication SCH: at 21:37

## 2019-06-22 RX ADMIN — SODIUM CHLORIDE PRN MLS/HR: 9 INJECTION, SOLUTION INTRAVENOUS at 17:38

## 2019-06-22 RX ADMIN — SUCRALFATE SCH GM: 1 TABLET ORAL at 21:36

## 2019-06-22 RX ADMIN — HEPARIN SODIUM SCH: 5000 INJECTION, SOLUTION INTRAVENOUS; SUBCUTANEOUS at 14:52

## 2019-06-22 RX ADMIN — DOCUSATE SODIUM SCH MG: 100 CAPSULE, LIQUID FILLED ORAL at 09:16

## 2019-06-22 RX ADMIN — Medication SCH: at 14:52

## 2019-06-22 NOTE — ER DOCUMENT REPORT
Doctor's Note


Notes: 





06/22/19 05:05


Performed a quick triage evaluation the patient.  Patient was seen in triage by 

nurse practitioner.  Patient is come back to the room.  I did evaluate the 

patient and complains of some upper abdominal pain.  He did improve some of the 

GI cocktail.  He was actually seen here approximately 10 hours ago with the same

complaints.  Labs at that time were non-concerning however he was tachycardic 

and returns tachycardic.  On exam his pain in the right upper quadrant he says 

this is where it hurts whenever he eats.  He has not had an ultrasound of his 

gallbladder or any work-up of the gallbladder thus far.  I did do a stool 

guaiac.  On rectal exam stool was brown.  Guaiac testing is negative.  I have 

ordered CBC, CMP, lipase, and ultrasound of his gallbladder.  Patient is 

agreeable to plan.





Dictation of this chart was performed using voice recognition software; 

therefore, there may be some unintended grammatical errors.


06/22/19 05:06

## 2019-06-22 NOTE — PDOC H&P
History of Present Illness


Admission Date/PCP: 


  06/22/19 07:59





  





Patient complains of: epigastric pain


History of Present Illness: 


ARJUN DOMINGUEZ is a 28 year old male with a past medical history significant 

for marijuana use, tobacco dependence, and multiple recent ER visits for right 

upper quadrant and epigastric abdominal discomfort diagnosed with cannabis hy

peremesis syndrome.


He presented to the emergency department again today with complaint of upper abd

ominal discomfort and nausea unrelieved by prescribed Prevacid and Zofran.  He 

reports the only thing that relieves his pain is to jog in place.  He was noted 

to be jogging during the ED triage assessment.


Evaluation by the emergency department providers have revealed tachycardia (HR 

126), hypertension (154/99), normal CBC, chemistry revealing acute kidney injury

(creatinine 1.62, BUN 21), and rhabdomyolysis (CK of 11,878).  Has a elevated 

CK-MB at 28 but a normal troponin.  Urinalysis is positive for protein and 

ketones.  UDS positive for opiates (morphine provided by the ED provider) and 

marijuana.


He is referred to the hospitalist service for admission and management of the 

above-stated complaints.





Past Medical History


Cardiac Medical History: Reports: None


Pulmonary Medical History: Reports: Asthma


EENT Medical History: Reports: None


Neurological Medical History: Reports: None


Endocrine Medical History: Reports: None


Malignancy Medical History: Reports: None


GI Medical History: Reports: None


Musculoskeltal Medical History: Reports: None


Skin Medical History: Reports: None


Psychiatric Medical History: Reports: Substance Abuse


Traumatic Medical History: Reports: None


Hematology: Reports: None


Infectious Medical History: Reports: None





Social History


Information Source: Patient


Smoking Status: Current Every Day Smoker


Hx Recreational Drug Use: Yes


Drugs: Marijuana


Hx Prescription Drug Abuse: No





- Advance Directive


Resuscitation Status: Full Code





Family History


Family History: Reviewed & Not Pertinent


Parental Family History Reviewed: Yes


Children Family History Reviewed: No


Sibling(s) Family History Reviewed.: No





Medication/Allergy


Home Medications: 








No Home Medications  06/22/19 








Allergies/Adverse Reactions: 


                                        





No Known Allergies Allergy (Verified 06/14/19 19:43)


   











Review of Systems


Constitutional: ABSENT: chills, fever(s), headache(s), weight gain, weight loss


Eyes: ABSENT: visual disturbances


Ears: ABSENT: hearing changes


Cardiovascular: ABSENT: chest pain, dyspnea on exertion, edema, orthropnea, 

palpitations


Respiratory: ABSENT: cough, hemoptysis


Gastrointestinal: PRESENT: as per HPI, abdominal pain, heartburn, nausea.  

ABSENT: constipation, diarrhea, hematemesis, hematochezia, vomiting


Genitourinary: ABSENT: dysuria, hematuria


Musculoskeletal: ABSENT: joint swelling


Integumentary: ABSENT: rash, wounds


Neurological: ABSENT: abnormal gait, abnormal speech, confusion, dizziness, 

focal weakness, syncope


Psychiatric: ABSENT: anxiety, depression, homidical ideation, suicidal ideation


Endocrine: ABSENT: cold intolerance, heat intolerance, polydipsia, polyuria


Hematologic/Lymphatic: ABSENT: easy bleeding, easy bruising





Physical Exam


Vital Signs: 


                                        











Temp Pulse Resp BP Pulse Ox


 


 98.1 F   76   17   132/65 H  99 


 


 06/22/19 12:00  06/22/19 12:00  06/22/19 12:00  06/22/19 12:00  06/22/19 12:00








                                 Intake & Output











 06/21/19 06/22/19 06/23/19





 06:59 06:59 06:59


 


Intake Total   1000


 


Balance   1000


 


Weight  70.3 kg 71 kg











General appearance: PRESENT: no acute distress, well-developed, well-nourished


Head exam: PRESENT: atraumatic, normocephalic


Eye exam: PRESENT: conjunctiva pink, EOMI, PERRLA.  ABSENT: scleral icterus


Ear exam: PRESENT: normal external ear exam


Mouth exam: PRESENT: moist, tongue midline


Teeth exam: PRESENT: poor dentation


Neck exam: ABSENT: carotid bruit, JVD, lymphadenopathy, thyromegaly


Respiratory exam: PRESENT: clear to auscultation viet, symmetrical, unlabored.  

ABSENT: rales, rhonchi, wheezes


Cardiovascular exam: PRESENT: RRR, +S1, +S2, tachycardia.  ABSENT: diastolic 

murmur, rubs, systolic murmur


Pulses: PRESENT: normal dorsalis pedis pul


Vascular exam: PRESENT: normal capillary refill


GI/Abdominal exam: PRESENT: normal bowel sounds, soft, tenderness - Epigastric. 

ABSENT: distended, guarding, mass, organolmegaly, rebound


Rectal exam: PRESENT: deferred, heme (-) stool - Per ED provider


Extremities exam: PRESENT: full ROM.  ABSENT: calf tenderness, clubbing, pedal 

edema


Musculoskeletal exam: PRESENT: ambulatory


Neurological exam: PRESENT: alert, awake, oriented to person, oriented to place,

oriented to time, oriented to situation, CN II-XII grossly intact.  ABSENT: 

motor sensory deficit


Psychiatric exam: PRESENT: agitated, appropriate affect.  ABSENT: homicidal 

ideation, suicidal ideation


Skin exam: PRESENT: dry, intact, warm.  ABSENT: cyanosis, rash





Results


Laboratory Results: 


                                        





                                 06/22/19 05:35 





                                 06/22/19 05:35 





                                        











  06/22/19 06/22/19 06/22/19





  05:35 05:35 14:00


 


WBC  7.6  


 


RBC  4.57  


 


Hgb  15.6  


 


Hct  45.0  


 


MCV  98 H  


 


MCH  34.2 H  


 


MCHC  34.7  


 


RDW  13.5  


 


Plt Count  239  


 


Seg Neutrophils %  69.6  


 


Lymphocytes %  18.5  


 


Monocytes %  11.3  


 


Eosinophils %  0.0  


 


Basophils %  0.6  


 


Absolute Neutrophils  5.3  


 


Absolute Lymphocytes  1.4  


 


Absolute Monocytes  0.9  


 


Absolute Eosinophils  0.0  


 


Absolute Basophils  0.0  


 


Sodium   137.4 


 


Potassium   4.3 


 


Chloride   98 


 


Carbon Dioxide   26 


 


Anion Gap   13 


 


BUN   21 H 


 


Creatinine   1.62 H 


 


Est GFR ( Amer)   > 60 


 


Est GFR (Non-Af Amer)   52 L 


 


Glucose   104 


 


Calcium   10.5 H 


 


Total Bilirubin   1.3 


 


AST   366 H 


 


ALT   97 H 


 


Alkaline Phosphatase   66 


 


Total Protein   8.2 


 


Albumin   5.1 H 


 


Lipase   58.8 


 


Urine Color    YELLOW


 


Urine Appearance    SLIGHTLY-CLOUDY


 


Urine pH    6.0


 


Ur Specific Gravity    1.025


 


Urine Protein    30 H


 


Urine Glucose (UA)    NEGATIVE


 


Urine Ketones    TRACE H


 


Urine Blood    NEGATIVE


 


Urine Nitrite    NEGATIVE


 


Ur Leukocyte Esterase    MODERATE H


 


Urine WBC (Auto)    41


 


Urine RBC (Auto)    4








                                        











  06/22/19 06/22/19





  05:35 05:35


 


Creatine Kinase  94911 H 


 


CK-MB (CK-2)   28.00 H


 


Troponin I   < 0.012











Impressions: 


                                        





Abdomen Ultrasound  06/22/19 05:04


IMPRESSION:


 


Unremarkable abdominal ultrasound


 


 


copyright 2011 [employer]- All Rights Reserved


 














Assessment and Plan





- Diagnosis


(1) Rhabdomyolysis


Qualifiers: 


   Rhabdomyolysis type: non-traumatic   Qualified Code(s): M62.82 - 

Rhabdomyolysis   


Is this a current diagnosis for this admission?: Yes   


Plan: 


Likely secondary to the patient's compulsive jogging in place.  He reports that 

this is the only thing that relieves his abdominal discomfort; per the ED 

provider and previous visit records, the patient has been utilizing this tactic 

for pain relief since 6/14/2019.


Despite numerous attempts to educate the patient with regard to physical rest by

ED provider, myself, and nursing staff, he continues to jog in place.  When 

checked on again this afternoon, he was noted to be profusely sweating related 

to his physical activity in his room.


When attempting to explain again the importance of rest and minimal activity at 

this time, the patient became agitated, stating "if you felt like this, you'd do

whatever it takes.  I don't care that its bad for my kidneys.  Let it kill me, 

at least I wont hurt anymore."





Patient is admitted to the medical floor.


He is provided generous IVF fluids.


Will provide Haldol IV for agitation and management of nausea/discomfort.


Will monitor urine output.


Check daily chemistries.


Consider mental health consultation if compulsive physical activity continues.








(2) ELROY (acute kidney injury)


Is this a current diagnosis for this admission?: Yes   


Plan: 


Secondary to #1.





Avoid nephrotoxic medications as able.


Generous IV fluids.


Daily chemistries.








(3) Cannabinoid hyperemesis syndrome


Is this a current diagnosis for this admission?: Yes   


Plan: 


Patient with multiple emergency department visits for nausea, vomiting, and 

gastritis related to heavy marijuana use.


He has been educated on the importance of discontinuing marijuana use.





IV fluids and antiemetics as above.


Per patient and nursing, he has been eating and drinking since arrival to his 

room.  He is noted to have a does not empty cranberry juice cups on his bedside 

table.  It is likely that his p.o. intake is exacerbating his gastritis.


Therefore, he is placed in n.p.o. other than water and ice chips for now.  


Will advance to clear liquids as abdominal pain improves (avoiding cranberry 

juice related to its acidity).








(4) Marijuana use


Is this a current diagnosis for this admission?: Yes   


Plan: 


Cessation strongly encouraged.








- Time


Time Spent with patient: 25-34 minutes


Medications reviewed and adjusted accordingly: Yes


Anticipated discharge: Home

## 2019-06-22 NOTE — ER DOCUMENT REPORT
ED Medical Screen (RME)





- General


Stated Complaint: ABDOMINAL PAIN


Time Seen by Provider: 06/22/19 01:14


Notes: 





Patient is a 28-year-old male who presents the emergency department with a chief

complaint of mid upper abdominal pain.  He was seen here in the emergency de

partment yesterday with the same issues and he was sent home with Zofran and 

Prevacid.  He states that the medications are not working and he continues to 

have pain.  He does admit to having melena stools.





Exam: Tender mid upper abdomen.





I have greeted and performed a rapid initial assessment of this patient.  A 

comprehensive ED assessment and evaluation of the patient, analysis of test 

results and completion of medical decision making process will be conducted by 

an additional ED providers.


TRAVEL OUTSIDE OF THE [place] IN LAST 30 DAYS: No





- Related Data


Allergies/Adverse Reactions: 


                                        





No Known Allergies Allergy (Verified 06/14/19 19:43)


   











Past Medical History


Renal/ Medical History: Denies: Hx Peritoneal Dialysis





- Immunizations


Hx Diphtheria, Pertussis, Tetanus Vaccination: No


History of Influenza Vaccine for 10/2017 - 3/2018 Season: No





Physical Exam





- Vital signs


Vitals: 





                                        











Temp Pulse BP Pulse Ox


 


 97.8 F   126 H  154/99 H  97 


 


 06/22/19 01:08  06/22/19 01:08  06/22/19 01:08  06/22/19 01:08














Course





- Vital Signs


Vital signs: 





                                        











Temp Pulse Resp BP Pulse Ox


 


 97.8 F   126 H     154/99 H  97 


 


 06/22/19 01:08  06/22/19 01:08     06/22/19 01:08  06/22/19 01:08

## 2019-06-22 NOTE — RADIOLOGY REPORT (SQ)
EXAM DESCRIPTION: 



US ABDOMEN DOPPLER LIMITED



COMPLETED DATE/TME:  06/22/2019 05:04



CLINICAL HISTORY: 



28 years, Male, RUQ abdominal pain



COMPARISON:

6/24/2019



TECHNIQUE:

Grayscale and color images of the abdomen



LIMITATIONS:

None.



FINDINGS:



The visualized portions of the pancreas and aorta appear

unremarkable.

The liver is normal in size, shape, and echotexture. The liver

measures 15.0 cm. The main portal vein demonstrates hepatopedal

flow.

The gallbladder appears unremarkable. No evidence of

cholelithiasis or wall thickening. No sonographic Jacobson sign was

elicited. The common bile duct is normal in caliber measuring up

to 3 mm in diameter.

The right kidney measures 10.8 x 4.3 x 5.0 cm. No hydronephrosis.



IMPRESSION:



Unremarkable abdominal ultrasound

 



copyright 2011 [employer]- All Rights Reserved

## 2019-06-22 NOTE — ER DOCUMENT REPORT
Entered by WASHINGTON LOYA SCRIBE  06/22/19 0742 





Acting as scribe for:JOSE MANUEL LINDSEY DO





ED General





- General


Chief Complaint: Abdominal Pain


Stated Complaint: ABDOMINAL PAIN


Time Seen by Provider: 06/22/19 01:14


Mode of Arrival: Ambulatory


Information source: Patient


Notes: 


Patient is a 28 year old male presenting to the emergency department complaining

of abdominal pain and vomiting onset 1 week ago. Patient states the pain is 

located across is upper abdomen that is exacerbated with eating warm and spicy 

foods. Patient presented to this ED yesterday and 6/14/19 complaining of similar

symptoms. Patient was diagnosed with gastritis and cannabinoid hyperemesis sy

ndrome and discharged home. Patient states he was prescribed Zofran and Prevacid

and reports no relief with these medications. Patient also complains of black 

stools since onset of abdominal pain on 6/14/19 and reports taking Pepto Bismol.

He denies any fevers, diarrhea or hematemesis.  





Dr. Hill performed a rectal exam and stool guaiac in triage. Guaiac testing 

was negative. See provider's note for further details.   


TRAVEL OUTSIDE OF THE [place] IN LAST 30 DAYS: No





- Related Data


Allergies/Adverse Reactions: 


                                        





No Known Allergies Allergy (Verified 06/14/19 19:43)


   











Past Medical History





- General


Information source: Patient





- Social History


Smoking Status: Current Some Day Smoker


Cigarette use (# per day): Yes


Chew tobacco use (# tins/day): No


Smoking Education Provided: No


Frequency of alcohol use: Rare


Drug Abuse: Marijuana


Family History: Reviewed & Not Pertinent


Patient has suicidal ideation: No


Patient has homicidal ideation: No


Pulmonary Medical History: Reports: Hx Asthma





- Immunizations


Hx Diphtheria, Pertussis, Tetanus Vaccination: No





Review of Systems





- Review of Systems


Constitutional: No symptoms reported


EENT: No symptoms reported


Cardiovascular: No symptoms reported


Respiratory: No symptoms reported


Gastrointestinal: See HPI, Abdominal pain, Vomiting, Black stools


Genitourinary: No symptoms reported


Male Genitourinary: No symptoms reported


Musculoskeletal: No symptoms reported


Skin: No symptoms reported


Hematologic/Lymphatic: No symptoms reported


Neurological/Psychological: No symptoms reported


-: Yes All other systems reviewed and negative





Physical Exam





- Vital signs


Vitals: 


                                        











Temp Pulse BP Pulse Ox


 


 97.8 F   126 H  154/99 H  97 


 


 06/22/19 01:08  06/22/19 01:08  06/22/19 01:08  06/22/19 01:08














- Notes


Notes: 


 


GENERAL: Initially sleeping. Alert, interacts well. No acute distress.


 


HEAD: Normocephalic, atraumatic.


 


EYES: Pupils equal, round, and reactive to light. Extraocular movements intact.


 


ENT: Oral mucosa moist, tongue midline. 


 


NECK: Full range of motion. Supple. Trachea midline.


 


LUNGS: Clear to auscultation bilaterally, no wheezes, rales, or rhonchi. No 

respiratory distress.


 


HEART: Regular rate and rhythm, no tachycardia on my exam. No murmurs, gallops, 

or rubs.


 


ABDOMEN: Soft, mild LUQ tenderness to palpation, moderate epigastric tenderness 

to palpation, no right upper quadrant tenderness to palpation. Non-distended. 

Bowel sounds present in all 4 quadrants. No guarding, rigidity, or rebound.


 


EXTREMITIES: Moves all 4 extremities spontaneously. No edema, radial and 

dorsalis pedis pulses 2/4 bilaterally. No cyanosis.


 


NEUROLOGICAL: Alert and oriented x3. Normal speech. 


 


PSYCH: Normal affect, normal mood.


 


SKIN: Warm, dry, normal turgor. No rashes or lesions noted.








Course





- Re-evaluation


Re-evalutation: 





06/22/19 07:54


CBC unremarkable, CMP shows new onset renal failure with a BUN of 21 and 

creatinine 1.62, AST elevated 366, ALT elevated at 97, alkaline phosphatase 

normal at 66, right upper quadrant ultrasound was ordered but is unremarkable, 

lipase normal, patient has a CK which is quite elevated at 11,878 as well as a 

CK-MB elevated at 28 but a normal troponin.  Patient has not provided a urine 

sample yet, IV fluids have been started, patient appears to be in rhabdomyolysis

likely related to the constant movement that he has been seen engaging in 

throughout his last 2 visits to the emergency department.  Patient states that 

when he is in pain it is only relieved by jogging in place.  Patient was 

discussed with Maria Ines Hernández the nurse practitioner who agreed to admit the 

patient to her service.  Patient is not having any pain at this time.  Patient 

does not have an acute abdomen.





- Vital Signs


Vital signs: 


                                        











Temp Pulse Resp BP Pulse Ox


 


 97.8 F   60   14   154/99 H  100 


 


 06/22/19 01:08  06/22/19 06:19  06/22/19 06:19 06/22/19 01:08  06/22/19 06:19














- Laboratory


Result Diagrams: 


                                 06/22/19 05:35





                                 06/22/19 05:35


Laboratory results interpreted by me: 


                                        











  06/22/19 06/22/19 06/22/19





  05:35 05:35 05:35


 


MCV  98 H  


 


MCH  34.2 H  


 


BUN   21 H 


 


Creatinine   1.62 H 


 


Est GFR (Non-Af Amer)   52 L 


 


Calcium   10.5 H 


 


AST   366 H 


 


ALT   97 H 


 


Creatine Kinase    78982 H


 


CK-MB (CK-2)   


 


Albumin   5.1 H 














  06/22/19





  05:35


 


MCV 


 


MCH 


 


BUN 


 


Creatinine 


 


Est GFR (Non-Af Amer) 


 


Calcium 


 


AST 


 


ALT 


 


Creatine Kinase 


 


CK-MB (CK-2)  28.00 H


 


Albumin 














Discharge





- Discharge


Clinical Impression: 


Rhabdomyolysis


Qualifiers:


 Rhabdomyolysis type: non-traumatic Qualified Code(s): M62.82 - Rhabdomyolysis





Condition: Stable


Disposition: ADMITTED AS INPATIENT


Admitting Provider: Slava (Hospitalist)


Unit Admitted: Medical Floor





I personally performed the services described in the documentation, reviewed and

edited the documentation which was dictated to the scribe in my presence, and it

accurately records my words and actions.

## 2019-06-23 LAB
ANION GAP SERPL CALC-SCNC: 7 MMOL/L (ref 5–19)
BUN SERPL-MCNC: 14 MG/DL (ref 7–20)
CALCIUM: 9.1 MG/DL (ref 8.4–10.2)
CHLORIDE SERPL-SCNC: 103 MMOL/L (ref 98–107)
CK SERPL-CCNC: 8913 U/L (ref 55–170)
CO2 SERPL-SCNC: 26 MMOL/L (ref 22–30)
ERYTHROCYTE [DISTWIDTH] IN BLOOD BY AUTOMATED COUNT: 13.6 % (ref 11.5–14)
GLUCOSE SERPL-MCNC: 98 MG/DL (ref 75–110)
HCT VFR BLD CALC: 37.1 % (ref 37.9–51)
HGB BLD-MCNC: 13 G/DL (ref 13.5–17)
MCH RBC QN AUTO: 34.9 PG (ref 27–33.4)
MCHC RBC AUTO-ENTMCNC: 35.1 G/DL (ref 32–36)
MCV RBC AUTO: 99 FL (ref 80–97)
PLATELET # BLD: 194 10^3/UL (ref 150–450)
POTASSIUM SERPL-SCNC: 4 MMOL/L (ref 3.6–5)
RBC # BLD AUTO: 3.73 10^6/UL (ref 4.35–5.55)
SODIUM SERPL-SCNC: 135.8 MMOL/L (ref 137–145)
WBC # BLD AUTO: 6 10^3/UL (ref 4–10.5)

## 2019-06-23 RX ADMIN — TRAMADOL HYDROCHLORIDE PRN MG: 50 TABLET, FILM COATED ORAL at 22:41

## 2019-06-23 RX ADMIN — SUCRALFATE SCH GM: 1 TABLET ORAL at 15:30

## 2019-06-23 RX ADMIN — SUCRALFATE SCH GM: 1 TABLET ORAL at 08:00

## 2019-06-23 RX ADMIN — TRAMADOL HYDROCHLORIDE PRN MG: 50 TABLET, FILM COATED ORAL at 16:20

## 2019-06-23 RX ADMIN — HALOPERIDOL LACTATE PRN MG: 5 INJECTION, SOLUTION INTRAMUSCULAR at 18:27

## 2019-06-23 RX ADMIN — PANTOPRAZOLE SODIUM SCH MG: 40 INJECTION, POWDER, FOR SOLUTION INTRAVENOUS at 22:41

## 2019-06-23 RX ADMIN — Medication SCH: at 14:18

## 2019-06-23 RX ADMIN — DICYCLOMINE HYDROCHLORIDE SCH MG: 20 TABLET ORAL at 22:41

## 2019-06-23 RX ADMIN — HEPARIN SODIUM SCH: 5000 INJECTION, SOLUTION INTRAVENOUS; SUBCUTANEOUS at 05:04

## 2019-06-23 RX ADMIN — PANTOPRAZOLE SODIUM SCH MG: 40 INJECTION, POWDER, FOR SOLUTION INTRAVENOUS at 10:36

## 2019-06-23 RX ADMIN — SODIUM CHLORIDE PRN MLS/HR: 9 INJECTION, SOLUTION INTRAVENOUS at 15:32

## 2019-06-23 RX ADMIN — SODIUM CHLORIDE PRN MLS/HR: 9 INJECTION, SOLUTION INTRAVENOUS at 01:40

## 2019-06-23 RX ADMIN — SUCRALFATE SCH GM: 1 TABLET ORAL at 10:36

## 2019-06-23 RX ADMIN — SUCRALFATE SCH GM: 1 TABLET ORAL at 22:41

## 2019-06-23 RX ADMIN — Medication SCH: at 22:49

## 2019-06-23 RX ADMIN — DOCUSATE SODIUM SCH MG: 100 CAPSULE, LIQUID FILLED ORAL at 10:36

## 2019-06-23 RX ADMIN — HEPARIN SODIUM SCH: 5000 INJECTION, SOLUTION INTRAVENOUS; SUBCUTANEOUS at 22:49

## 2019-06-23 RX ADMIN — HEPARIN SODIUM SCH: 5000 INJECTION, SOLUTION INTRAVENOUS; SUBCUTANEOUS at 14:17

## 2019-06-23 RX ADMIN — Medication SCH: at 05:04

## 2019-06-23 RX ADMIN — SODIUM CHLORIDE PRN MLS/HR: 9 INJECTION, SOLUTION INTRAVENOUS at 22:42

## 2019-06-23 NOTE — PDOC PROGRESS REPORT
Subjective


Progress Note for:: 06/23/19


Subjective:: 


ARJUN DOMINGUEZ is a 28 year old male with a past medical history significant 

for marijuana use, tobacco dependence, and multiple recent ER visits for right 

upper quadrant and epigastric abdominal discomfort diagnosed with cannabis 

hyperemesis syndrome who was admitted 6/22/19 for ELROY, rhabdomyolysis, and 

cannabinoid hyperemesis syndrome.





Patient was seen on morning rounds.  He was found resting in bed comfortably on 

room air.  He is awake, A&O x4, but minimally conversational with me.  He is 

watching videos on his phone, does not make eye contact, and only occasionally 

responds to questions.


He does deny abdominal discomfort and nausea.


He does not voice any questions or concerns.


Nursing reports that patient continued to intermittently jog overnight, walk in 

hallways, and has been unhooking his IVF.  He is advised to leave the IV alone 

to prevent infection and rest as much as possible.





Reason For Visit: 


RHABDOMYOLYSIS








Physical Exam


Vital Signs: 


                                        











Temp Pulse Resp BP Pulse Ox


 


 97.8 F   65   16   129/80 H  100 


 


 06/23/19 11:46  06/23/19 11:46  06/23/19 11:46  06/23/19 11:46  06/23/19 11:46








                                 Intake & Output











 06/22/19 06/23/19 06/24/19





 06:59 06:59 06:59


 


Intake Total  3640 


 


Output Total  250 


 


Balance  3390 


 


Weight 70.3 kg 69.4 kg 











General appearance: PRESENT: no acute distress, well-developed, well-nourished. 

ABSENT: cooperative


Head exam: PRESENT: atraumatic, normocephalic


Eye exam: PRESENT: conjunctiva pink, EOMI, PERRLA.  ABSENT: scleral icterus


Ear exam: PRESENT: normal external ear exam


Mouth exam: PRESENT: moist, tongue midline


Neck exam: ABSENT: carotid bruit, JVD, lymphadenopathy, thyromegaly


Respiratory exam: PRESENT: clear to auscultation viet.  ABSENT: rales, rhonchi, 

wheezes


Cardiovascular exam: PRESENT: RRR.  ABSENT: diastolic murmur, rubs, systolic 

murmur


Pulses: PRESENT: normal dorsalis pedis pul


Vascular exam: PRESENT: normal capillary refill


GI/Abdominal exam: PRESENT: normal bowel sounds, soft.  ABSENT: distended, 

guarding, mass, organolmegaly, rebound, tenderness


Rectal exam: PRESENT: deferred


Extremities exam: PRESENT: full ROM.  ABSENT: calf tenderness, clubbing, pedal 

edema


Neurological exam: PRESENT: alert, awake, oriented to person, oriented to place,

oriented to time, oriented to situation, CN II-XII grossly intact.  ABSENT: 

motor sensory deficit


Psychiatric exam: PRESENT: appropriate affect, normal mood.  ABSENT: homicidal 

ideation, suicidal ideation


Skin exam: PRESENT: dry, intact, warm.  ABSENT: cyanosis, rash





Results


Laboratory Results: 


                                        





                                 06/23/19 04:46 





                                 06/23/19 04:46 





                                        











  06/22/19 06/23/19 06/23/19





  14:00 04:46 04:46


 


WBC   6.0 


 


RBC   3.73 L 


 


Hgb   13.0 L D 


 


Hct   37.1 L 


 


MCV   99 H 


 


MCH   34.9 H 


 


MCHC   35.1 


 


RDW   13.6 


 


Plt Count   194 


 


Sodium    135.8 L


 


Potassium    4.0


 


Chloride    103


 


Carbon Dioxide    26


 


Anion Gap    7


 


BUN    14


 


Creatinine    0.94


 


Est GFR ( Amer)    > 60


 


Est GFR (Non-Af Amer)    > 60


 


Glucose    98


 


Calcium    9.1


 


Urine Color  YELLOW  


 


Urine Appearance  SLIGHTLY-CLOUDY  


 


Urine pH  6.0  


 


Ur Specific Gravity  1.025  


 


Urine Protein  30 H  


 


Urine Glucose (UA)  NEGATIVE  


 


Urine Ketones  TRACE H  


 


Urine Blood  NEGATIVE  


 


Urine Nitrite  NEGATIVE  


 


Ur Leukocyte Esterase  MODERATE H  


 


Urine WBC (Auto)  41  


 


Urine RBC (Auto)  4  








                                        











  06/22/19 06/22/19 06/23/19





  05:35 05:35 04:46


 


Creatine Kinase  24453 H   8913 H


 


CK-MB (CK-2)   28.00 H 


 


Troponin I   < 0.012 











Impressions: 


                                        





Abdomen Ultrasound  06/22/19 05:04


IMPRESSION:


 


Unremarkable abdominal ultrasound


 


 


copyright 2011 [employer]- All Rights Reserved


 














Assessment and Plan





- Diagnosis


(1) Rhabdomyolysis


Qualifiers: 


   Rhabdomyolysis type: non-traumatic   Qualified Code(s): M62.82 - 

Rhabdomyolysis   


Is this a current diagnosis for this admission?: Yes   


Plan: 


Improved; CK 11.8k -> 8.9k


Likely secondary to the patient's compulsive jogging in place.  He reports that 

this is the only thing that relieves his abdominal discomfort; per the ED pr

ovider and previous visit records, the patient has been utilizing this tactic 

for pain relief since 6/14/2019.


Despite numerous attempts to educate the patient with regard to physical rest by

ED provider, myself, and nursing staff, he continues to jog in place. 





Patient is admitted to the medical floor.


He is provided generous IVF fluids.


Will provide Haldol IV for agitation and management of nausea/discomfort.


Will monitor urine output.


Check daily chemistries.


Consider mental health consultation if compulsive physical activity continues.








(2) ELROY (acute kidney injury)


Is this a current diagnosis for this admission?: Yes   


Plan: 


Resolved; Cr 1.62-> 0.94


Secondary to #1.





Avoid nephrotoxic medications as able.


Generous IV fluids.


Daily chemistries.








(3) Cannabinoid hyperemesis syndrome


Is this a current diagnosis for this admission?: Yes   


Plan: 


Patient with multiple emergency department visits for nausea, vomiting, and 

gastritis related to heavy marijuana use.


He has been educated on the importance of discontinuing marijuana use.





Continue IV fluids.


Advance to clear liquid diet.


IV Haldol as needed for nausea and discomfort related to hyperemesis syndrome.


Phenergan and Zofran as needed for nausea and vomiting.


Scheduled Carafate.


Maalox as needed.


Scheduled Protonix twice daily.


Tramadol as needed for pain.








(4) Marijuana use


Is this a current diagnosis for this admission?: Yes   


Plan: 


Cessation strongly encouraged.








- Time


Time Spent with patient: 15-24 minutes


Medications reviewed and adjusted accordingly: Yes


Anticipated discharge: Home


Within: within 48 hours

## 2019-06-24 LAB
ANION GAP SERPL CALC-SCNC: 4 MMOL/L (ref 5–19)
BUN SERPL-MCNC: 9 MG/DL (ref 7–20)
CALCIUM: 8.3 MG/DL (ref 8.4–10.2)
CHLORIDE SERPL-SCNC: 106 MMOL/L (ref 98–107)
CK SERPL-CCNC: 9153 U/L (ref 55–170)
CO2 SERPL-SCNC: 26 MMOL/L (ref 22–30)
ERYTHROCYTE [DISTWIDTH] IN BLOOD BY AUTOMATED COUNT: 13.7 % (ref 11.5–14)
GLUCOSE SERPL-MCNC: 97 MG/DL (ref 75–110)
HCT VFR BLD CALC: 36.6 % (ref 37.9–51)
HGB BLD-MCNC: 12.5 G/DL (ref 13.5–17)
MCH RBC QN AUTO: 34.3 PG (ref 27–33.4)
MCHC RBC AUTO-ENTMCNC: 34.1 G/DL (ref 32–36)
MCV RBC AUTO: 101 FL (ref 80–97)
PLATELET # BLD: 155 10^3/UL (ref 150–450)
POTASSIUM SERPL-SCNC: 3.7 MMOL/L (ref 3.6–5)
RBC # BLD AUTO: 3.64 10^6/UL (ref 4.35–5.55)
SODIUM SERPL-SCNC: 136.1 MMOL/L (ref 137–145)
WBC # BLD AUTO: 4.3 10^3/UL (ref 4–10.5)

## 2019-06-24 RX ADMIN — Medication SCH ML: at 21:38

## 2019-06-24 RX ADMIN — HEPARIN SODIUM SCH: 5000 INJECTION, SOLUTION INTRAVENOUS; SUBCUTANEOUS at 15:58

## 2019-06-24 RX ADMIN — DICYCLOMINE HYDROCHLORIDE SCH MG: 20 TABLET ORAL at 15:58

## 2019-06-24 RX ADMIN — SODIUM CHLORIDE PRN MLS/HR: 9 INJECTION, SOLUTION INTRAVENOUS at 00:50

## 2019-06-24 RX ADMIN — AMLODIPINE BESYLATE SCH MG: 5 TABLET ORAL at 15:58

## 2019-06-24 RX ADMIN — PANTOPRAZOLE SODIUM SCH MG: 40 INJECTION, POWDER, FOR SOLUTION INTRAVENOUS at 10:04

## 2019-06-24 RX ADMIN — DOCUSATE SODIUM SCH MG: 100 CAPSULE, LIQUID FILLED ORAL at 10:04

## 2019-06-24 RX ADMIN — Medication SCH: at 16:38

## 2019-06-24 RX ADMIN — Medication SCH: at 05:11

## 2019-06-24 RX ADMIN — HEPARIN SODIUM SCH: 5000 INJECTION, SOLUTION INTRAVENOUS; SUBCUTANEOUS at 21:37

## 2019-06-24 RX ADMIN — DICYCLOMINE HYDROCHLORIDE SCH MG: 20 TABLET ORAL at 21:37

## 2019-06-24 RX ADMIN — SUCRALFATE SCH GM: 1 TABLET ORAL at 11:06

## 2019-06-24 RX ADMIN — HEPARIN SODIUM SCH: 5000 INJECTION, SOLUTION INTRAVENOUS; SUBCUTANEOUS at 05:11

## 2019-06-24 RX ADMIN — DIAZEPAM PRN MG: 2 TABLET ORAL at 11:06

## 2019-06-24 RX ADMIN — DICYCLOMINE HYDROCHLORIDE SCH MG: 20 TABLET ORAL at 17:35

## 2019-06-24 RX ADMIN — SODIUM CHLORIDE PRN MLS/HR: 9 INJECTION, SOLUTION INTRAVENOUS at 20:00

## 2019-06-24 RX ADMIN — SODIUM CHLORIDE PRN MLS/HR: 9 INJECTION, SOLUTION INTRAVENOUS at 03:00

## 2019-06-24 RX ADMIN — TRAMADOL HYDROCHLORIDE PRN MG: 50 TABLET, FILM COATED ORAL at 15:57

## 2019-06-24 RX ADMIN — SUCRALFATE SCH GM: 1 TABLET ORAL at 21:37

## 2019-06-24 RX ADMIN — DICYCLOMINE HYDROCHLORIDE SCH MG: 20 TABLET ORAL at 10:04

## 2019-06-24 RX ADMIN — PANTOPRAZOLE SODIUM SCH MG: 40 INJECTION, POWDER, FOR SOLUTION INTRAVENOUS at 21:36

## 2019-06-24 RX ADMIN — SUCRALFATE SCH GM: 1 TABLET ORAL at 07:29

## 2019-06-24 RX ADMIN — SUCRALFATE SCH GM: 1 TABLET ORAL at 17:35

## 2019-06-24 NOTE — PSYCHOLOGICAL NOTE
Psych Note





- Psych Note


Date seen by psych provider: 06/24/19


Psych Note: 


Presenting Problem: Compulsive Exercise. Chart review and face to face. Patient 

came to ED 06/22/19 for upper abdominal pain and vomiting x 1 week. Was given a 

GI cocktail and had some improvement. Had been seen about 10 hours prior for 

similar etiology. Both times he was tachycardic. The firs time he was diagnosed 

with gastritis and cannabinoid hyperemesis syndrome. Second visit he was a same 

day admit for rhabdomyolysis, ELROY, cannabinoid hyperemesis syndrome and 

marijuana use. UDS is positive for opiates and cannabis. Psychiatric medications

listed on MAR are Haldol 10MG PO Q8H PRN restlessness/agitation, Haldol 5MG IV 

Q4H PRN restlessness/agitation and Valium 2MG PO Q6H PRN anxiety/agitation. 

Observed attending nurses trying to get an IV without success. Patient walked 

the hallway and talked with this clinician. He was made aware of a study 

explaining Marijuana/Cannabinoid Hyperemesis Syndrome and how using marijuana 

may be contributing to his abdominal pain and vomiting. He said if that was the 

case he would try to stop because he did not want to feel that way anymore. he 

said he does read and would be interested in a copy of the study (which was left

for patient). He said he was not eating because "it either goes down and doesn't

feel good or comes up. He agreed to try to start off eating small amounts (part 

of a cracker, then a whole cracker) and work his way up to meals. He stated he 

was drinking lots of water and wanted Gatorade but the blue and fruit punch make

him vomit so he needs grape. He denied any previous  treatment to include 

medication management, other outpatient services and inpatient hospitalizations.

He denied smoking in the hospital and was encouraged this is time to not use to 

see if his symptoms get better.   

















Diagnosis:


Rhabdo


ELROY


Cannabis Use Disorder, Severe


Bulimia





Medication recommendations made by the psychiatric medical provider, Dr. Deborah BRONSON, includes:


Continue Haldol and Ativan already listed in the MAR





Impression/Plan: Recommendation to utilize Motivational Interviewing approach to

address his Cannabis use advising him to stop. Sounds like he could be Bulimic 

which will need to be explored. Both will likely require ongoing outpatient 

treatment and support. provided patient with a copy of the Cannabinoid 

Hyperemesis Syndrome Study. Consulted with Dr. Jackson regarding the management 

and care of patient.

## 2019-06-24 NOTE — PDOC PROGRESS REPORT
Subjective


Progress Note for:: 06/24/19


Subjective:: 


ARJUN DOMINGUEZ is a 28 year old male with a past medical history significant 

for marijuana use, tobacco dependence, and multiple recent ER visits for right 

upper quadrant and epigastric abdominal discomfort diagnosed with cannabis 

hyperemesis syndrome who was admitted 6/22/19 for ELROY, rhabdomyolysis, and 

cannabinoid hyperemesis syndrome.





Patient was seen on morning rounds.  Upon entering the room, he was found with 

IVF disconnected, in bed exercising (in plank position doing leg lifts).  

Patient stated that this was to relieve right knee discomfort, "it feels like 

its going to pop."  He was advised to stop exercising; again explained that 

physical activity is causing muscle break down leading to kidney injury.  

Patient agitatedly responded, "I am resting! I'm not running in place any more, 

am I?"


He denies chest pain, dyspnea, abdominal discomfort, nausea and vomiting.


He does not voice questions or concerns.





Numerous overnight events reported; see nursing notes.


Reason For Visit: 


RHABDOMYOLYSIS








Physical Exam


Vital Signs: 


                                        











Temp Pulse Resp BP Pulse Ox


 


 98.5 F   63   13   150/90 H  100 


 


 06/24/19 07:36  06/24/19 07:36  06/24/19 07:36  06/24/19 07:36  06/24/19 07:36








                                 Intake & Output











 06/23/19 06/24/19 06/25/19





 06:59 06:59 06:59


 


Intake Total 4640 3750 


 


Output Total 250 750 


 


Balance 4390 3000 


 


Weight 69.4 kg 69.4 kg 











General appearance: PRESENT: no acute distress, well-developed, well-nourished. 

ABSENT: cooperative


Head exam: PRESENT: atraumatic, normocephalic


Eye exam: PRESENT: conjunctiva pink, EOMI, PERRLA.  ABSENT: scleral icterus


Ear exam: PRESENT: normal external ear exam


Mouth exam: PRESENT: moist, tongue midline


Neck exam: ABSENT: carotid bruit, JVD, lymphadenopathy, thyromegaly


Respiratory exam: PRESENT: clear to auscultation viet.  ABSENT: rales, rhonchi, 

wheezes


Cardiovascular exam: PRESENT: RRR.  ABSENT: diastolic murmur, rubs, systolic 

murmur


Pulses: PRESENT: normal dorsalis pedis pul


Vascular exam: PRESENT: normal capillary refill


GI/Abdominal exam: PRESENT: normal bowel sounds, soft.  ABSENT: distended, 

guarding, mass, organolmegaly, rebound, tenderness


Rectal exam: PRESENT: deferred


Extremities exam: PRESENT: full ROM.  ABSENT: calf tenderness, clubbing, pedal 

edema


Neurological exam: PRESENT: alert, awake, oriented to person, oriented to place,

oriented to time, oriented to situation, CN II-XII grossly intact.  ABSENT: 

motor sensory deficit


Psychiatric exam: PRESENT: agitated.  ABSENT: homicidal ideation, suicidal 

ideation


Focused psych exam: PRESENT: restlessness


Skin exam: PRESENT: dry, intact, warm.  ABSENT: cyanosis, rash





Results


Laboratory Results: 


                                        





                                 06/24/19 05:23 





                                 06/24/19 05:23 





                                        











  06/24/19 06/24/19





  05:23 05:23


 


WBC  4.3 


 


RBC  3.64 L 


 


Hgb  12.5 L 


 


Hct  36.6 L 


 


MCV  101 H 


 


MCH  34.3 H 


 


MCHC  34.1 


 


RDW  13.7 


 


Plt Count  155 


 


Sodium   136.1 L


 


Potassium   3.7


 


Chloride   106


 


Carbon Dioxide   26


 


Anion Gap   4 L


 


BUN   9


 


Creatinine   0.75


 


Est GFR ( Amer)   > 60


 


Est GFR (Non-Af Amer)   > 60


 


Glucose   97


 


Calcium   8.3 L








                                        











  06/22/19 06/22/19 06/23/19





  05:35 05:35 04:46


 


Creatine Kinase  54269 H   8913 H


 


CK-MB (CK-2)   28.00 H 


 


Troponin I   < 0.012 














  06/24/19





  05:23


 


Creatine Kinase  9153 H


 


CK-MB (CK-2) 


 


Troponin I 











Impressions: 


                                        





Abdomen Ultrasound  06/22/19 05:04


IMPRESSION:


 


Unremarkable abdominal ultrasound


 


 


copyright 2011 [employer]- All Rights Reserved


 














Assessment and Plan





- Diagnosis


(1) Rhabdomyolysis


Qualifiers: 


   Rhabdomyolysis type: non-traumatic   Qualified Code(s): M62.82 - 

Rhabdomyolysis   


Is this a current diagnosis for this admission?: Yes   


Plan: 


Overall improved; CK 11.8k -> 8.9k-> 9.1k


Likely secondary to the patient's compulsive exercising.  He reports that this 

is the only thing that relieves his abdominal discomfort; per the ED provider 

and previous visit records, the patient has been utilizing this tactic for pain 

relief since 6/14/2019.


Despite numerous attempts to educate the patient with regard to physical rest by

ED provider, myself, and nursing staff, he continues to jog in place; now found 

to be in bed exercising (planks and leg lifts)





Patient is admitted to the medical floor.


He is provided generous IVF fluids.


Will provide Haldol IV for agitation and management of nausea/discomfort.


Will monitor urine output.


Check daily chemistries.


Have consulted mental health services for evaluation and guidance.








(2) ELROY (acute kidney injury)


Is this a current diagnosis for this admission?: Yes   


Plan: 


Resolved; Cr 1.62-> 0.94-> 0.75


Secondary to #1.





Avoid nephrotoxic medications as able.


Generous IV fluids.


Daily chemistries.








(3) Cannabinoid hyperemesis syndrome


Is this a current diagnosis for this admission?: Yes   


Plan: 


Patient with multiple emergency department visits for nausea, vomiting, and 

gastritis related to heavy marijuana use.


He has been educated on the importance of discontinuing marijuana use.


Per nursing; questionable marijuana use overnight.  Security was notified.





Continue IV fluids.


Clear liquid diet; advance as tolerated.


IV Haldol as needed for nausea and discomfort related to hyperemesis syndrome.


Phenergan and Zofran as needed for nausea and vomiting.


Scheduled Carafate.


Maalox as needed.


Scheduled Protonix twice daily.


Bentyl twice daily.


Tramadol as needed for pain.








(4) Marijuana use


Is this a current diagnosis for this admission?: Yes   


Plan: 


Cessation strongly encouraged.  Questionable use overnight; see nurse notes.





Mental health services consulted.








- Time


Time Spent with patient: 25-34 minutes


Medications reviewed and adjusted accordingly: Yes


Anticipated discharge: Home


Within: within 72 hours

## 2019-06-25 VITALS — SYSTOLIC BLOOD PRESSURE: 147 MMHG | DIASTOLIC BLOOD PRESSURE: 96 MMHG

## 2019-06-25 LAB
ALBUMIN SERPL-MCNC: 3.2 G/DL (ref 3.5–5)
ALP SERPL-CCNC: 47 U/L (ref 38–126)
ALT SERPL-CCNC: 98 U/L (ref 21–72)
ANION GAP SERPL CALC-SCNC: 8 MMOL/L (ref 5–19)
AST SERPL-CCNC: 231 U/L (ref 17–59)
BILIRUB DIRECT SERPL-MCNC: 0.2 MG/DL (ref 0–0.4)
BILIRUB SERPL-MCNC: 0.9 MG/DL (ref 0.2–1.3)
BUN SERPL-MCNC: 5 MG/DL (ref 7–20)
CALCIUM: 8.9 MG/DL (ref 8.4–10.2)
CHLORIDE SERPL-SCNC: 101 MMOL/L (ref 98–107)
CK SERPL-CCNC: 7290 U/L (ref 55–170)
CO2 SERPL-SCNC: 27 MMOL/L (ref 22–30)
ERYTHROCYTE [DISTWIDTH] IN BLOOD BY AUTOMATED COUNT: 13.2 % (ref 11.5–14)
GLUCOSE SERPL-MCNC: 85 MG/DL (ref 75–110)
HCT VFR BLD CALC: 38.7 % (ref 37.9–51)
HGB BLD-MCNC: 13.3 G/DL (ref 13.5–17)
MCH RBC QN AUTO: 34.1 PG (ref 27–33.4)
MCHC RBC AUTO-ENTMCNC: 34.3 G/DL (ref 32–36)
MCV RBC AUTO: 99 FL (ref 80–97)
PLATELET # BLD: 182 10^3/UL (ref 150–450)
POTASSIUM SERPL-SCNC: 3.4 MMOL/L (ref 3.6–5)
PROT SERPL-MCNC: 5.5 G/DL (ref 6.3–8.2)
RBC # BLD AUTO: 3.9 10^6/UL (ref 4.35–5.55)
SODIUM SERPL-SCNC: 135.5 MMOL/L (ref 137–145)
WBC # BLD AUTO: 5.5 10^3/UL (ref 4–10.5)

## 2019-06-25 RX ADMIN — Medication SCH: at 05:14

## 2019-06-25 RX ADMIN — PANTOPRAZOLE SODIUM SCH MG: 40 INJECTION, POWDER, FOR SOLUTION INTRAVENOUS at 09:45

## 2019-06-25 RX ADMIN — Medication SCH: at 14:13

## 2019-06-25 RX ADMIN — HEPARIN SODIUM SCH: 5000 INJECTION, SOLUTION INTRAVENOUS; SUBCUTANEOUS at 05:14

## 2019-06-25 RX ADMIN — SUCRALFATE SCH GM: 1 TABLET ORAL at 07:25

## 2019-06-25 RX ADMIN — DICYCLOMINE HYDROCHLORIDE SCH MG: 20 TABLET ORAL at 14:12

## 2019-06-25 RX ADMIN — SUCRALFATE SCH: 1 TABLET ORAL at 11:10

## 2019-06-25 RX ADMIN — DIAZEPAM PRN MG: 2 TABLET ORAL at 11:13

## 2019-06-25 RX ADMIN — AMLODIPINE BESYLATE SCH MG: 5 TABLET ORAL at 09:44

## 2019-06-25 RX ADMIN — HEPARIN SODIUM SCH: 5000 INJECTION, SOLUTION INTRAVENOUS; SUBCUTANEOUS at 14:06

## 2019-06-25 RX ADMIN — DICYCLOMINE HYDROCHLORIDE SCH MG: 20 TABLET ORAL at 09:47

## 2019-06-25 RX ADMIN — DOCUSATE SODIUM SCH MG: 100 CAPSULE, LIQUID FILLED ORAL at 09:45

## 2019-06-25 RX ADMIN — SODIUM CHLORIDE PRN MLS/HR: 9 INJECTION, SOLUTION INTRAVENOUS at 05:20

## 2019-06-25 RX ADMIN — TRAMADOL HYDROCHLORIDE PRN MG: 50 TABLET, FILM COATED ORAL at 11:12

## 2019-07-07 NOTE — PDOC DISCHARGE SUMMARY
General





- Admit/Disc Date/PCP


Admission Date/Primary Care Provider: 


  06/22/19 07:59





  





Discharge Date: 06/25/19





- Discharge Diagnosis


(1) Rhabdomyolysis


Is this a current diagnosis for this admission?: Yes   


Summary: 





CK 11.8k -> 8.9k-> 9.1k


Likely secondary to the patient's compulsive exercising.  He reports that this 

is the only thing that relieves his abdominal discomfort; per the ED provider 

and previous visit records, the patient has been utilizing this tactic for pain 

relief since 6/14/2019.


Despite numerous attempts to educate the patient with regard to physical rest by

ED provider, myself, and nursing staff, he continued to jog in place; found to 

be in bed exercising (planks and leg lifts).





He was provided generous IVF fluids.


PRN Haldol IV for agitation and management of nausea/discomfort.


Mental health services were consulted for evaluation and guidance, they recom

mended avoiding benzodiazepines.














(2) ELROY (acute kidney injury)


Is this a current diagnosis for this admission?: Yes   


Summary: 





Secondary to Rhabdomyolysis


Resolved with generous IVF; Cr 1.62-> 0.94-> 0.75














(3) Cannabinoid hyperemesis syndrome


Is this a current diagnosis for this admission?: Yes   


Summary: 


 


Patient with multiple emergency department visits for nausea, vomiting, and 

gastritis related to heavy marijuana use.


He has been educated on the importance of discontinuing marijuana use.





Originally on a clear liquid diet; was advanced to solids on the final hospital 

day, patient able to tolerate without difficulty.


IV Haldol as needed for nausea and discomfort related to hyperemesis syndrome.


Phenergan and Zofran as needed for nausea and vomiting.


Scheduled Carafate.


Maalox as needed.


Scheduled Protonix twice daily.


Bentyl twice daily.


Tramadol as needed for pain.














(4) Marijuana use


Is this a current diagnosis for this admission?: Yes   


Summary: 


see above








- Additional Information


Resuscitation Status: Full Code


Discharge Diet: As Tolerated, Cardiac


Discharge Activity: Balance Activity w/Rest


Prescriptions: 


Amlodipine Besylate [Norvasc 5 mg Tablet] 5 mg PO DAILY #30 tablet


Ondansetron [Zofran Odt 4 mg Tablet] 1 - 2 tab PO Q4HP PRN #10 tab.rapdis


 PRN Reason: 


Prochlorperazine Maleate [Compazine 10 mg Tablet] 10 mg PO Q6HP PRN #25 tablet


 PRN Reason: 


Home Medications: 








Amlodipine Besylate [Norvasc 5 mg Tablet] 5 mg PO DAILY #30 tablet 06/25/19 


Ondansetron [Zofran Odt 4 mg Tablet] 1 - 2 tab PO Q4HP PRN #10 tab.rapdis 06/ 25/19 


Prochlorperazine Maleate [Compazine 10 mg Tablet] 10 mg PO Q6HP PRN #25 tablet 

06/25/19 











History of Present Illness


History of Present Illness: 


ARJUN DOMINGUEZ is a 28 year old male with a past medical history significant 

for marijuana use, tobacco dependence, and multiple recent ER visits for right 

upper quadrant and epigastric abdominal discomfort diagnosed with cannabis 

hyperemesis syndrome.


He presented to the emergency department again today with complaint of upper 

abdominal discomfort and nausea unrelieved by prescribed Prevacid and Zofran.  

He reports the only thing that relieves his pain is to jog in place.  He was 

noted to be jogging during the ED triage assessment.


Evaluation by the emergency department providers have revealed tachycardia (HR 

126), hypertension (154/99), normal CBC, chemistry revealing acute kidney injury

(creatinine 1.62, BUN 21), and rhabdomyolysis (CK of 11,878).  Has a elevated 

CK-MB at 28 but a normal troponin.  Urinalysis is positive for protein and 

ketones.  UDS positive for opiates (morphine provided by the ED provider) and 

marijuana.


He is referred to the hospitalist service for admission and management of the 

above-stated complaints.








Hospital Course


Hospital Course: 





detailed plan listed above. 





Ultimately, the patient was caught smoking marijuana in his hospital room 

bathroom. Additionally, the patient would take daily 45 minute showers with his 

girlfriend, despite many requests to stop this practice. The patient was 

discharged on hospital day #3. 








Physical Exam


Vital Signs: 


                                        











Temp Pulse Resp BP Pulse Ox


 


 98.3 F   74   16   147/96 H  100 


 


 06/25/19 14:28  06/25/19 14:28  06/25/19 14:28  06/25/19 14:28 06/25/19 14:28











General appearance: PRESENT: no acute distress, well-developed, well-nourished


Head exam: PRESENT: atraumatic, normocephalic


Eye exam: PRESENT: conjunctiva pink, EOMI, PERRLA.  ABSENT: scleral icterus


Ear exam: PRESENT: normal external ear exam


Mouth exam: PRESENT: moist, tongue midline


Neck exam: ABSENT: carotid bruit, JVD, lymphadenopathy, thyromegaly


Respiratory exam: PRESENT: clear to auscultation viet.  ABSENT: rales, rhonchi, 

wheezes


Cardiovascular exam: PRESENT: RRR.  ABSENT: diastolic murmur, rubs, systolic 

murmur


Pulses: PRESENT: normal radial pulses, normal dorsalis pedis pul


Vascular exam: PRESENT: normal capillary refill


GI/Abdominal exam: PRESENT: normal bowel sounds, soft.  ABSENT: distended, 

guarding, mass, organolmegaly, rebound, tenderness


Rectal exam: PRESENT: deferred


Extremities exam: PRESENT: full ROM.  ABSENT: calf tenderness, clubbing, pedal 

edema


Neurological exam: PRESENT: alert, awake, oriented to person, oriented to place,

oriented to time, oriented to situation.  ABSENT: motor sensory deficit


Psychiatric exam: PRESENT: appropriate affect, normal mood.  ABSENT: suicidal 

ideation


Skin exam: PRESENT: dry, intact, warm.  ABSENT: cyanosis, rash





Results


Laboratory Results: 


                                        





                                 06/25/19 06:00 





                                 06/25/19 06:00 





                                        











  06/22/19 06/22/19 06/23/19





  05:35 05:35 04:46


 


Creatine Kinase  15774 H   8913 H


 


CK-MB (CK-2)   28.00 H 


 


Troponin I   < 0.012 














  06/24/19 06/25/19





  05:23 06:00


 


Creatine Kinase  9153 H  7290 H


 


CK-MB (CK-2)  


 


Troponin I  











Impressions: 


                                        





Abdomen Ultrasound  06/22/19 05:04


IMPRESSION:


 


Unremarkable abdominal ultrasound


 


 


copyright 2011 [employer]- All Rights Reserved


 














Qualifiers





- *


PATIENT BEING DISCHARGED WITH ANY OF THE FOLLOWING DIAGNOSIS: No





Acute Heart Failure





- **


Is this a Heart Failure Patient?: No

## 2020-04-14 ENCOUNTER — HOSPITAL ENCOUNTER (EMERGENCY)
Dept: HOSPITAL 62 - ER | Age: 30
LOS: 1 days | Discharge: HOME | End: 2020-04-15
Payer: SELF-PAY

## 2020-04-14 VITALS — SYSTOLIC BLOOD PRESSURE: 135 MMHG | DIASTOLIC BLOOD PRESSURE: 98 MMHG

## 2020-04-14 DIAGNOSIS — F17.200: ICD-10-CM

## 2020-04-14 DIAGNOSIS — R10.13: Primary | ICD-10-CM

## 2020-04-14 DIAGNOSIS — R11.2: ICD-10-CM

## 2020-04-14 DIAGNOSIS — R10.30: ICD-10-CM

## 2020-04-14 PROCEDURE — 80053 COMPREHEN METABOLIC PANEL: CPT

## 2020-04-14 PROCEDURE — 36415 COLL VENOUS BLD VENIPUNCTURE: CPT

## 2020-04-14 PROCEDURE — 99284 EMERGENCY DEPT VISIT MOD MDM: CPT

## 2020-04-14 PROCEDURE — 83690 ASSAY OF LIPASE: CPT

## 2020-04-14 PROCEDURE — 83605 ASSAY OF LACTIC ACID: CPT

## 2020-04-14 PROCEDURE — 96374 THER/PROPH/DIAG INJ IV PUSH: CPT

## 2020-04-14 PROCEDURE — 96361 HYDRATE IV INFUSION ADD-ON: CPT

## 2020-04-14 PROCEDURE — 82550 ASSAY OF CK (CPK): CPT

## 2020-04-14 PROCEDURE — 96375 TX/PRO/DX INJ NEW DRUG ADDON: CPT

## 2020-04-14 PROCEDURE — 80307 DRUG TEST PRSMV CHEM ANLYZR: CPT

## 2020-04-14 PROCEDURE — 85025 COMPLETE CBC W/AUTO DIFF WBC: CPT

## 2020-04-15 LAB
ADD MANUAL DIFF: NO
ALBUMIN SERPL-MCNC: 5.5 G/DL (ref 3.5–5)
ALP SERPL-CCNC: 105 U/L (ref 38–126)
ANION GAP SERPL CALC-SCNC: 14 MMOL/L (ref 5–19)
AST SERPL-CCNC: 45 U/L (ref 17–59)
BASOPHILS # BLD AUTO: 0.1 10^3/UL (ref 0–0.2)
BASOPHILS NFR BLD AUTO: 0.8 % (ref 0–2)
BILIRUB DIRECT SERPL-MCNC: 0.1 MG/DL (ref 0–0.4)
BILIRUB SERPL-MCNC: 1.1 MG/DL (ref 0.2–1.3)
BUN SERPL-MCNC: 12 MG/DL (ref 7–20)
CALCIUM: 10.5 MG/DL (ref 8.4–10.2)
CHLORIDE SERPL-SCNC: 103 MMOL/L (ref 98–107)
CO2 SERPL-SCNC: 24 MMOL/L (ref 22–30)
EOSINOPHIL # BLD AUTO: 0 10^3/UL (ref 0–0.6)
EOSINOPHIL NFR BLD AUTO: 0.1 % (ref 0–6)
ERYTHROCYTE [DISTWIDTH] IN BLOOD BY AUTOMATED COUNT: 14.7 % (ref 11.5–14)
ETHANOL SERPL-MCNC: 12 MG/DL
GLUCOSE SERPL-MCNC: 74 MG/DL (ref 75–110)
HCT VFR BLD CALC: 45.4 % (ref 37.9–51)
HGB BLD-MCNC: 15.8 G/DL (ref 13.5–17)
LYMPHOCYTES # BLD AUTO: 1.3 10^3/UL (ref 0.5–4.7)
LYMPHOCYTES NFR BLD AUTO: 8.4 % (ref 13–45)
MCH RBC QN AUTO: 34.3 PG (ref 27–33.4)
MCHC RBC AUTO-ENTMCNC: 34.9 G/DL (ref 32–36)
MCV RBC AUTO: 98 FL (ref 80–97)
MONOCYTES # BLD AUTO: 0.5 10^3/UL (ref 0.1–1.4)
MONOCYTES NFR BLD AUTO: 3.2 % (ref 3–13)
NEUTROPHILS # BLD AUTO: 13.3 10^3/UL (ref 1.7–8.2)
NEUTS SEG NFR BLD AUTO: 87.5 % (ref 42–78)
PLATELET # BLD: 248 10^3/UL (ref 150–450)
POTASSIUM SERPL-SCNC: 4.9 MMOL/L (ref 3.6–5)
PROT SERPL-MCNC: 9.3 G/DL (ref 6.3–8.2)
RBC # BLD AUTO: 4.62 10^6/UL (ref 4.35–5.55)
TOTAL CELLS COUNTED % (AUTO): 100 %
WBC # BLD AUTO: 15.2 10^3/UL (ref 4–10.5)

## 2020-04-15 NOTE — ER DOCUMENT REPORT
ED General





- General


Chief Complaint: Abdominal Pain


Stated Complaint: LOWER ABDOMINAL PAIN


Time Seen by Provider: 04/15/20 00:09


TRAVEL OUTSIDE OF THE [place] IN LAST 30 DAYS: No





- HPI


Notes: 





29-year-old male history of alcohol abuse, marijuana abuse, cannabinoid 

hyperemesis syndrome presents with approximately 8 hours of upper midline 

abdominal pain without radiation that is been constant associated with numerous 

episodes of nonbloody nonbilious emesis.  Patient says symptoms like the last 

time he came to ED for abdominal pain, which chart review shows was for 

cannabinoid hyperemesis syndrome.  Patient was on alcohol and marijuana binge 

yesterday and today.  Patient denies other drug use, trauma, chest pain, 

dizziness/syncope, fever, prior abdominal surgery, melena/bright red blood per 

rectum, immune compromise, flank pain, urinary symptoms, genital symptoms, 

cough/cold symptoms, myalgia, sick contacts, recent travel





- Related Data


Allergies/Adverse Reactions: 


                                        





No Known Allergies Allergy (Verified 06/14/19 19:43)


   











Past Medical History





- General


Information source: Patient





- Social History


Smoking Status: Current Every Day Smoker


Frequency of alcohol use: Social


Drug Abuse: Marijuana


Family History: Reviewed & Not Pertinent


Patient has suicidal ideation: No


Patient has homicidal ideation: No


Pulmonary Medical History: Reports: Hx Asthma


Renal/ Medical History: Denies: Hx Peritoneal Dialysis





- Immunizations


Hx Diphtheria, Pertussis, Tetanus Vaccination: No





Review of Systems





- Review of Systems


Notes: 





REVIEW OF SYSTEMS:


CONSTITUTIONAL :  Denies fever,  chills, or sweats. 


EENT: Denies recent cold/sinus symptoms, denies throat pain


CARDIOVASCULAR:  Denies chest pain, MALLIKA


RESPIRATORY:  Denies cough, denies shortness of breath.


GASTROINTESTINAL:  +abdominal pain, +nausea/vomiting.


GENITOURINARY:  Denies difficulty urinating, painful urination.


MUSCULOSKELETAL:  Denies neck pain, back pain.


SKIN:   Denies rash or skin lesions.


HEMATOLOGIC :   Denies easy bruising or bleeding.


LYMPHATIC:  Denies swollen, enlarged glands.


NEUROLOGICAL:  Denies headache, denies change in gait.


PSYCHIATRIC:  Denies anxiety or stress or depression.





Physical Exam





- Vital signs


Vitals: 


                                        











Temp Pulse Resp BP Pulse Ox


 


 98.2 F   115 H  20   135/98 H  98 


 


 04/14/20 23:09  04/14/20 23:09  04/14/20 23:09  04/14/20 23:09  04/14/20 23:09














- Notes


Notes: 





PHYSICAL EXAMINATION:


 


GENERAL: Well-appearing, disheveled, appearing older than stated age, in no 

acute distress.


 


HEAD: Atraumatic, normocephalic.


 


EYES: Pupils equal round and appropriate constriction, sclera anicteric, 

conjunctiva are normal.


 


ENT: nares patent, dry mucous membranes.


 


NECK: Normal range of motion, supple without lymphadenopathy


 


LUNGS: Breath sounds clear to auscultation bilaterally and equal.  No wheezes 

rales or rhonchi.


 


HEART: Tachycardic with regular rhythm without murmurs, rubs, or gallops


 


ABDOMEN: Soft, nontender, no guarding, no rebound, no masses, no CVAT


 


EXTREMITIES: Normal range of motion, no pitting or edema.  No cyanosis.


 


NEUROLOGICAL: Awake, alert, conversing appropriately, moves all extremities 

spontaneously.


 


PSYCH: euphoric mood, normal affect.


 


SKIN: Warm, Dry, normal turgor, no rashes or lesions noted.





Course





- Re-evaluation


Re-evalutation: 





04/15/20 02:09


Patient tachycardic otherwise normal vitals and benign exam.  Patient less 

tachycardic than prior visit for same complaint.  Most likely cannabinoid 

hyperemesis syndrome versus alcoholic pancreatitis versus alcohol induced 

gastritis.  Will replete fluids, give nausea and pain control, obtain abdominal 

labs, trial capsaicin, and reassess.  Unlikely biliary etiology given completely

nontender abdominal exam and presence of more likely cause, will reevaluate need

for imaging if patient does not improve with initial interventions.





04/15/20 02:55


Initially ordered lactate due to patient's tachycardia and leukocytosis, by upon

reevaluation patient's tachycardia had completely resolved and and patient sym

ptoms were also completely improved with patient remarking on how much better he

felt, exam remains completely benign, and no other clinical signs to suggest 

sepsis with initial leukocytosis likely secondary to stress response from acute 

vomiting which is now resolved.  Pulse 88 at 2:45 AM. patient tolerated p.o. cup

of ice water in ED.  Gave patient extensive follow-up instructions and return to

ED precautions and she demonstrated understanding of.  Instructed patient to 

stop marijuana use.  CK mildly elevated below the threshold for diagnosing 

rhabdomyolysis, patient instructed to follow-up with primary doctor.  Urinalysis

canceled as patient has no urinary symptoms.  Patient ready for discharge with 

outpatient follow-up and capsaicin.





- Vital Signs


Vital signs: 


                                        











Temp Pulse Resp BP Pulse Ox


 


 98.2 F   115 H  20   135/98 H  98 


 


 04/14/20 23:09  04/14/20 23:09  04/14/20 23:09  04/14/20 23:09  04/14/20 23:09











04/15/20 02:55


Pulse 88 04/15/20 02:45





- Laboratory


Result Diagrams: 


                                 04/15/20 00:53





                                 04/15/20 00:53


Laboratory results interpreted by me: 


                                        











  04/15/20 04/15/20 04/15/20





  00:53 00:53 00:53


 


WBC  15.2 H  


 


MCV  98 H  


 


MCH  34.3 H  


 


RDW  14.7 H  


 


Lymph % (Auto)  8.4 L  


 


Absolute Neuts (auto)  13.3 H  


 


Seg Neutrophils %  87.5 H  


 


Glucose   74 L 


 


Calcium   10.5 H 


 


Creatine Kinase    416 H


 


Total Protein   9.3 H 


 


Albumin   5.5 H 














Discharge





- Discharge


Clinical Impression: 


Vomiting


Qualifiers:


 Vomiting type: unspecified Vomiting Intractability: non-intractable Nausea 

presence: with nausea Qualified Code(s): R11.2 - Nausea with vomiting, 

unspecified





Abdominal pain


Qualifiers:


 Abdominal location: epigastric Qualified Code(s): R10.13 - Epigastric pain





Condition: Good


Disposition: HOME, SELF-CARE


Additional Instructions: 


Vomiting





     Vomiting (or nausea without vomiting) can be caused by many other different

problems. It can mean that something's wrong with the stomach, such as ulcers or

inflammation or the intestinal tract, such as appendicitis.  But it can also be 

a symptom of a problem that has nothing to do with the stomach or intestines. 

Vomiting is common with severe headaches, earaches, tonsillitis, and kidney 

infections, etc. We see it with pneumonia or heart attacks. Drugs can cause 

nausea and vomiting. Many abdominal problems cause vomiting; for example, 

gallstones, kidney stones, pancreatitis, and intestinal obstruction (blocked 

bowels).


     In most cases, curing the vomiting depends on fixing the problem that 

caused it. For temporary relief, we may use an anti-nausea medicine. For home 

use, we can prescribe suppositories, chewable pills, pills that dissolve in the 

mouth, or liquid anti-nausea drugs. If the vomiting seems to be caused by a 

problem in the stomach, acid-suppressing drugs may be prescribed as well.


     It's important to avoid dehydration. Sip small amounts of clear liquids 

(soft drinks, tea, broth, etc) . Try to take fluids frequently even if you are 

vomiting to prevent dehydration.  Take increasing amounts of fluid and when 

liquids are being consumed successfully, advance to small amounts of bland food 

(toast, soups, mashed potatoes, etc.) until you are able to resume a regular 

diet.  Avoid aspirin, tobacco, and alcohol.


     If the vomiting worsens, if the problem that's making you vomit worsens, or

if there's evidence of bleeding in the stomach (such as black, tarry stool, or 

bloody or black vomit), you should return immediately. Also, return if abdominal

pain worsens or becomes localized to one area or you develop high fever.  Call 

your doctor if you aren't improved in 24 hours.





Stop using marijuana immediately.  Return to ED immediately if you are unable to

keep down liquids by mouth, have dizziness or fainting, fever, return of abdomin

al pain, or any other worsening or alarming symptoms.  Follow-up with primary 

doctor within 1 week.  You have several abnormal findings on your lab work 

including leukocytosis, high albumin, high protein, high calcium, please follow 

these up when you see your primary doctor.


Prescriptions: 


Capsaicin 42.5 gm TP QPMP PRN #1 cream..g.


 PRN Reason: